# Patient Record
Sex: MALE | Race: WHITE | NOT HISPANIC OR LATINO | Employment: FULL TIME | URBAN - METROPOLITAN AREA
[De-identification: names, ages, dates, MRNs, and addresses within clinical notes are randomized per-mention and may not be internally consistent; named-entity substitution may affect disease eponyms.]

---

## 2017-04-07 DIAGNOSIS — F90.2 ADHD (ATTENTION DEFICIT HYPERACTIVITY DISORDER), COMBINED TYPE: ICD-10-CM

## 2017-04-07 RX ORDER — DEXTROAMPHETAMINE SACCHARATE, AMPHETAMINE ASPARTATE, DEXTROAMPHETAMINE SULFATE AND AMPHETAMINE SULFATE 5; 5; 5; 5 MG/1; MG/1; MG/1; MG/1
20 TABLET ORAL 2 TIMES DAILY
Qty: 60 TABLET | Refills: 0 | Status: SHIPPED | OUTPATIENT
Start: 2017-04-07 | End: 2017-08-21

## 2017-04-07 RX ORDER — DEXTROAMPHETAMINE SACCHARATE, AMPHETAMINE ASPARTATE, DEXTROAMPHETAMINE SULFATE AND AMPHETAMINE SULFATE 5; 5; 5; 5 MG/1; MG/1; MG/1; MG/1
20 TABLET ORAL 2 TIMES DAILY
Qty: 60 TABLET | Refills: 0 | Status: SHIPPED | OUTPATIENT
Start: 2017-05-06 | End: 2017-08-21

## 2017-04-07 RX ORDER — DEXTROAMPHETAMINE SACCHARATE, AMPHETAMINE ASPARTATE, DEXTROAMPHETAMINE SULFATE AND AMPHETAMINE SULFATE 5; 5; 5; 5 MG/1; MG/1; MG/1; MG/1
20 TABLET ORAL 2 TIMES DAILY
Qty: 60 TABLET | Refills: 0 | Status: SHIPPED | OUTPATIENT
Start: 2017-06-06 | End: 2017-07-03

## 2017-04-07 NOTE — TELEPHONE ENCOUNTER
I called patient and informed him that his prescriptions are ready to be picked up.    rx in book.    Laura Alcantar, CMA

## 2017-04-07 NOTE — TELEPHONE ENCOUNTER
Luis Alberto Marcuses Quanton called and left a message requesting a refill of their Pending Prescriptions:                       Disp   Refills    amphetamine-dextroamphetamine (ADDERALL) *60 tab*0            Sig: Take 1 tablet (20 mg) by mouth 2 times daily    amphetamine-dextroamphetamine (ADDERALL) *60 tab*0            Sig: Take 1 tablet (20 mg) by mouth 2 times daily    amphetamine-dextroamphetamine (ADDERALL) *60 tab*0            Sig: Take 1 tablet (20 mg) by mouth 2 times daily   If OK please print out and give to clinical support. We will than call the patient at [phone] to let them know that it is ready to be  at the [facility]. Thank-You,  Cara TOMLINSON WILL BE DUE FOR HIS 6 MO RECHECK AFTER THIS REFILL

## 2017-07-03 ENCOUNTER — TELEPHONE (OUTPATIENT)
Dept: FAMILY MEDICINE | Facility: CLINIC | Age: 32
End: 2017-07-03

## 2017-07-03 DIAGNOSIS — F90.2 ADHD (ATTENTION DEFICIT HYPERACTIVITY DISORDER), COMBINED TYPE: ICD-10-CM

## 2017-07-03 RX ORDER — DEXTROAMPHETAMINE SACCHARATE, AMPHETAMINE ASPARTATE, DEXTROAMPHETAMINE SULFATE AND AMPHETAMINE SULFATE 5; 5; 5; 5 MG/1; MG/1; MG/1; MG/1
20 TABLET ORAL 2 TIMES DAILY
Qty: 60 TABLET | Refills: 0 | Status: SHIPPED | OUTPATIENT
Start: 2017-07-03 | End: 2018-03-07

## 2017-07-03 NOTE — TELEPHONE ENCOUNTER
[facility] can you fill for JC - Luis Alberto is out of his meds and needs to be seen, however, he will be out of town so he is wondering if he can get one month and then will see [facility].  He would like to pick this up Wednesday    Patient's phone #[phone]    Pending Prescriptions:                       Disp   Refills    amphetamine-dextroamphetamine (ADDERALL) *60 tab*0            Sig: Take 1 tablet (20 mg) by mouth 2 times daily    USUALLY GETS 3 MONTHS AT A TIME, HOWEVER OVERDUE FOR OV SO JUST REFILLED #30

## 2017-07-03 NOTE — TELEPHONE ENCOUNTER
Called Pt to let him know that his Prescription is here and ready to .   Also that he needed an O.V.     Kamilla.TERA Tran ([facility])

## 2017-08-21 ENCOUNTER — OFFICE VISIT (OUTPATIENT)
Dept: FAMILY MEDICINE | Facility: CLINIC | Age: 32
End: 2017-08-21

## 2017-08-21 VITALS
TEMPERATURE: 98 F | OXYGEN SATURATION: 98 % | WEIGHT: 236.2 LBS | HEIGHT: 73 IN | HEART RATE: 75 BPM | BODY MASS INDEX: 31.3 KG/M2 | DIASTOLIC BLOOD PRESSURE: 90 MMHG | SYSTOLIC BLOOD PRESSURE: 120 MMHG

## 2017-08-21 DIAGNOSIS — B35.3 TINEA PEDIS OF LEFT FOOT: ICD-10-CM

## 2017-08-21 DIAGNOSIS — F90.2 ADHD (ATTENTION DEFICIT HYPERACTIVITY DISORDER), COMBINED TYPE: Primary | ICD-10-CM

## 2017-08-21 PROCEDURE — 99213 OFFICE O/P EST LOW 20 MIN: CPT | Performed by: FAMILY MEDICINE

## 2017-08-21 RX ORDER — DEXTROAMPHETAMINE SACCHARATE, AMPHETAMINE ASPARTATE, DEXTROAMPHETAMINE SULFATE AND AMPHETAMINE SULFATE 5; 5; 5; 5 MG/1; MG/1; MG/1; MG/1
20 TABLET ORAL 2 TIMES DAILY
Qty: 60 TABLET | Refills: 0 | Status: SHIPPED | OUTPATIENT
Start: 2017-10-20 | End: 2017-11-29

## 2017-08-21 RX ORDER — DEXTROAMPHETAMINE SACCHARATE, AMPHETAMINE ASPARTATE, DEXTROAMPHETAMINE SULFATE AND AMPHETAMINE SULFATE 5; 5; 5; 5 MG/1; MG/1; MG/1; MG/1
20 TABLET ORAL 2 TIMES DAILY
Qty: 60 TABLET | Refills: 0 | Status: SHIPPED | OUTPATIENT
Start: 2017-08-21 | End: 2017-09-20

## 2017-08-21 RX ORDER — DEXTROAMPHETAMINE SACCHARATE, AMPHETAMINE ASPARTATE, DEXTROAMPHETAMINE SULFATE AND AMPHETAMINE SULFATE 5; 5; 5; 5 MG/1; MG/1; MG/1; MG/1
20 TABLET ORAL 2 TIMES DAILY
Qty: 60 TABLET | Refills: 0 | Status: SHIPPED | OUTPATIENT
Start: 2017-09-20 | End: 2017-11-29

## 2017-08-21 NOTE — PROGRESS NOTES
"ADHD Follow Up   SUBJECTIVE:     Are your symptoms controlled? yes  Are you satisfied with your current medication dosage? yes  Are you taking your medication regularly? yes  Are you experiencing any insomnia? no  Are you experiencing any jitteriness? no  Are you experiencing any loss of appetite or weight loss? no  Are you experiencing any other side effects? no  ROS:   CONSTITUTIONAL:NEGATIVE   RESP: NEGATIVE   CV: NEGATIVE   NEURO: NEGATIVE    Left little toe space with rad rash, no itch, slight burn  OBJECTIVE:  /90 (BP Location: Left arm, Patient Position: Chair, Cuff Size: Adult Large)  Pulse 75  Temp 98  F (36.7  C) (Oral)  Ht 1.854 m (6' 1\")  Wt 107.1 kg (236 lb 3.2 oz)  SpO2 98%  BMI 31.16 kg/m2  S1 and S2 normal, no murmurs, clicks, gallops or rubs. Regular rate and rhythm. Chest is clear; no wheezes or rales. No edema or JVD.  Skin: erythematous mild maceration left smal, toe web space     ASSESSMENT:    (F90.2) ADHD (attention deficit hyperactivity disorder), combined type  (primary encounter diagnosis)  Comment: well controlled  Plan: amphetamine-dextroamphetamine (ADDERALL) 20 MG         per tablet, amphetamine-dextroamphetamine         (ADDERALL) 20 MG per tablet,         amphetamine-dextroamphetamine (ADDERALL) 20 MG         per tablet        continue current medications at current doses     (B35.3) Tinea pedis of left foot  Comment: discussed diagnosis  Plan:  recommend antifungal cream, spray or powder OTC, use powder  to wick away moisture, avoid prolonged moisture exposure        "

## 2017-08-21 NOTE — MR AVS SNAPSHOT
"              After Visit Summary   8/21/2017    Luis Alberto Claudio    MRN: [id number]           Patient Information     Date Of Birth          1985        Visit Information        Provider Department      8/21/2017 10:15 AM Dat Gamino MD [facility]        Today's Diagnoses     ADHD (attention deficit hyperactivity disorder), combined type    -  1    Tinea pedis of left foot           Follow-ups after your visit        Who to contact     If you have questions or need follow up information about today's clinic visit or your schedule please contact [facility] directly at [phone].  Normal or non-critical lab and imaging results will be communicated to you by MyChart, letter or phone within 4 business days after the clinic has received the results. If you do not hear from us within 7 days, please contact the clinic through MyChart or phone. If you have a critical or abnormal lab result, we will notify you by phone as soon as possible.  Submit refill requests through [employer] or call your pharmacy and they will forward the refill request to us. Please allow 3 business days for your refill to be completed.          Additional Information About Your Visit        Care EveryWhere ID     This is your Care EveryWhere ID. This could be used by other organizations to access your [place] medical records  [id number]        Your Vitals Were     Pulse Temperature Height Pulse Oximetry BMI (Body Mass Index)       75 98  F (36.7  C) (Oral) 1.854 m (6' 1\") 98% 31.16 kg/m2        Blood Pressure from Last 3 Encounters:   08/21/17 120/90   12/19/16 128/84   06/01/16 112/80    Weight from Last 3 Encounters:   08/21/17 107.1 kg (236 lb 3.2 oz)   12/19/16 104.2 kg (229 lb 12.8 oz)   06/01/16 97.3 kg (214 lb 6.4 oz)              Today, you had the following     No orders found for display         Today's Medication Changes          These changes are accurate as of: " 8/21/17 10:31 AM.  If you have any questions, ask your nurse or doctor.               These medicines have changed or have updated prescriptions.        Dose/Directions    * amphetamine-dextroamphetamine 20 MG per tablet   Commonly known as:  ADDERALL   This may have changed:  Another medication with the same name was added. Make sure you understand how and when to take each.   Used for:  ADHD (attention deficit hyperactivity disorder), combined type   Changed by:  Dat Gamino MD        Dose:  20 mg   Take 1 tablet (20 mg) by mouth 2 times daily   Quantity:  60 tablet   Refills:  0       * amphetamine-dextroamphetamine 20 MG per tablet   Commonly known as:  ADDERALL   This may have changed:  You were already taking a medication with the same name, and this prescription was added. Make sure you understand how and when to take each.   Used for:  ADHD (attention deficit hyperactivity disorder), combined type   Changed by:  Dat Gamino MD        Dose:  20 mg   Take 1 tablet (20 mg) by mouth 2 times daily   Quantity:  60 tablet   Refills:  0       * amphetamine-dextroamphetamine 20 MG per tablet   Commonly known as:  ADDERALL   This may have changed:  You were already taking a medication with the same name, and this prescription was added. Make sure you understand how and when to take each.   Used for:  ADHD (attention deficit hyperactivity disorder), combined type   Changed by:  Dat Gamino MD        Dose:  20 mg   Start taking on:  9/20/2017   Take 1 tablet (20 mg) by mouth 2 times daily   Quantity:  60 tablet   Refills:  0       * amphetamine-dextroamphetamine 20 MG per tablet   Commonly known as:  ADDERALL   This may have changed:  You were already taking a medication with the same name, and this prescription was added. Make sure you understand how and when to take each.   Used for:  ADHD (attention deficit hyperactivity disorder), combined type   Changed by:  Dat Gamino  MD Salazar        Dose:  20 mg   Start taking on:  10/20/2017   Take 1 tablet (20 mg) by mouth 2 times daily   Quantity:  60 tablet   Refills:  0       * Notice:  This list has 4 medication(s) that are the same as other medications prescribed for you. Read the directions carefully, and ask your doctor or other care provider to review them with you.         Where to get your medicines      Some of these will need a paper prescription and others can be bought over the counter.  Ask your nurse if you have questions.     Bring a paper prescription for each of these medications     amphetamine-dextroamphetamine 20 MG per tablet    amphetamine-dextroamphetamine 20 MG per tablet    amphetamine-dextroamphetamine 20 MG per tablet                Primary Care Provider Office Phone # Fax #    Dat Gamino [phone] [address] [address]        Equal Access to Services     [facility]: Hadii aad ku hadasho Soomaali, waaxda luqadaha, qaybta kaalmada adeegyada, mary beth torres hayamina garcia . So [facility] [phone].    ATENCIÓN: Si habla español, tiene a boyd disposición servicios gratuitos de asistencia lingüística. [facility] [phone].    We comply with applicable federal civil rights laws and [place] laws. We do not discriminate on the basis of race, color, national origin, age, disability sex, sexual orientation or gender identity.            Thank you!     Thank you for choosing [facility]  for your care. Our goal is always to provide you with excellent care. Hearing back from our patients is one way we can continue to improve our services. Please take a few minutes to complete the written survey that you may receive in the mail after your visit with us. Thank you!             Your Updated Medication List - Protect others around you: Learn how to safely use, store and throw away your medicines at www.disposemymeds.org.           This list is accurate as of: 8/21/17 10:31 AM.  Always use your most recent med list.                   Brand Name Dispense Instructions for use Diagnosis    * amphetamine-dextroamphetamine 20 MG per tablet    ADDERALL    60 tablet    Take 1 tablet (20 mg) by mouth 2 times daily    ADHD (attention deficit hyperactivity disorder), combined type       * amphetamine-dextroamphetamine 20 MG per tablet    ADDERALL    60 tablet    Take 1 tablet (20 mg) by mouth 2 times daily    ADHD (attention deficit hyperactivity disorder), combined type       * amphetamine-dextroamphetamine 20 MG per tablet   Start taking on:  9/20/2017    ADDERALL    60 tablet    Take 1 tablet (20 mg) by mouth 2 times daily    ADHD (attention deficit hyperactivity disorder), combined type       * amphetamine-dextroamphetamine 20 MG per tablet   Start taking on:  10/20/2017    ADDERALL    60 tablet    Take 1 tablet (20 mg) by mouth 2 times daily    ADHD (attention deficit hyperactivity disorder), combined type       * Notice:  This list has 4 medication(s) that are the same as other medications prescribed for you. Read the directions carefully, and ask your doctor or other care provider to review them with you.

## 2017-08-21 NOTE — NURSING NOTE
Luis Alberto Claudio is here today for a non fasting recheck of his ADHD medication.    Pre-Visit Screening :  Immunizations : up to date  Colon Screening : NA  Asthma Action Test/Plan : NA  PHQ9/GAD7 :  NA    Pulse - regular  My Chart - declines    CLASSIFICATION OF OVERWEIGHT AND OBESITY BY BMI                         Obesity Class           BMI(kg/m2)  Underweight                                    < 18.5  Normal                                         18.5-24.9  Overweight                                     25.0-29.9  OBESITY                     I                  30.0-34.9                              II                 35.0-39.9  EXTREME OBESITY             III                >40                             Patient's  BMI Body mass index is 31.16 kg/(m^2).  http://hin.nhlbi.nih.gov/menuplanner/menu.cgi  Questioned patient about current smoking habits.  Pt. has never smoked.    Laura Alcantar, CMA

## 2017-11-29 DIAGNOSIS — F90.2 ADHD (ATTENTION DEFICIT HYPERACTIVITY DISORDER), COMBINED TYPE: ICD-10-CM

## 2017-11-29 RX ORDER — DEXTROAMPHETAMINE SACCHARATE, AMPHETAMINE ASPARTATE, DEXTROAMPHETAMINE SULFATE AND AMPHETAMINE SULFATE 5; 5; 5; 5 MG/1; MG/1; MG/1; MG/1
20 TABLET ORAL 2 TIMES DAILY
Qty: 60 TABLET | Refills: 0 | Status: SHIPPED | OUTPATIENT
Start: 2018-02-28 | End: 2018-03-07

## 2017-11-29 RX ORDER — DEXTROAMPHETAMINE SACCHARATE, AMPHETAMINE ASPARTATE, DEXTROAMPHETAMINE SULFATE AND AMPHETAMINE SULFATE 5; 5; 5; 5 MG/1; MG/1; MG/1; MG/1
20 TABLET ORAL 2 TIMES DAILY
Qty: 60 TABLET | Refills: 0 | Status: SHIPPED | OUTPATIENT
Start: 2018-01-28 | End: 2017-11-30

## 2017-11-29 RX ORDER — DEXTROAMPHETAMINE SACCHARATE, AMPHETAMINE ASPARTATE, DEXTROAMPHETAMINE SULFATE AND AMPHETAMINE SULFATE 5; 5; 5; 5 MG/1; MG/1; MG/1; MG/1
20 TABLET ORAL 2 TIMES DAILY
Qty: 60 TABLET | Refills: 0 | Status: SHIPPED | OUTPATIENT
Start: 2017-11-29 | End: 2017-12-29

## 2017-11-29 NOTE — TELEPHONE ENCOUNTER
Luis Alberto Claudio is requesting a refill of:    Pending Prescriptions:                       Disp   Refills    amphetamine-dextroamphetamine (ADDERALL) *60 tab*0            Sig: Take 1 tablet (20 mg) by mouth 2 times daily    amphetamine-dextroamphetamine (ADDERALL) *60 tab*0            Sig: Take 1 tablet (20 mg) by mouth 2 times daily    amphetamine-dextroamphetamine (ADDERALL) *60 tab*0            Sig: Take 1 tablet (20 mg) by mouth 2 times daily    Can call - [phone]

## 2017-11-30 DIAGNOSIS — F90.2 ADHD (ATTENTION DEFICIT HYPERACTIVITY DISORDER), COMBINED TYPE: ICD-10-CM

## 2017-11-30 RX ORDER — DEXTROAMPHETAMINE SACCHARATE, AMPHETAMINE ASPARTATE, DEXTROAMPHETAMINE SULFATE AND AMPHETAMINE SULFATE 5; 5; 5; 5 MG/1; MG/1; MG/1; MG/1
20 TABLET ORAL 2 TIMES DAILY
Qty: 60 TABLET | Refills: 0 | Status: SHIPPED | OUTPATIENT
Start: 2017-12-28 | End: 2018-03-07

## 2018-03-07 DIAGNOSIS — F90.2 ADHD (ATTENTION DEFICIT HYPERACTIVITY DISORDER), COMBINED TYPE: ICD-10-CM

## 2018-03-07 RX ORDER — DEXTROAMPHETAMINE SACCHARATE, AMPHETAMINE ASPARTATE, DEXTROAMPHETAMINE SULFATE AND AMPHETAMINE SULFATE 5; 5; 5; 5 MG/1; MG/1; MG/1; MG/1
20 TABLET ORAL 2 TIMES DAILY
Qty: 60 TABLET | Refills: 0 | Status: SHIPPED | OUTPATIENT
Start: 2018-03-07 | End: 2018-10-22

## 2018-03-07 RX ORDER — DEXTROAMPHETAMINE SACCHARATE, AMPHETAMINE ASPARTATE, DEXTROAMPHETAMINE SULFATE AND AMPHETAMINE SULFATE 5; 5; 5; 5 MG/1; MG/1; MG/1; MG/1
20 TABLET ORAL 2 TIMES DAILY
Qty: 60 TABLET | Refills: 0 | Status: SHIPPED | OUTPATIENT
Start: 2018-04-07 | End: 2018-10-22

## 2018-03-07 RX ORDER — DEXTROAMPHETAMINE SACCHARATE, AMPHETAMINE ASPARTATE, DEXTROAMPHETAMINE SULFATE AND AMPHETAMINE SULFATE 5; 5; 5; 5 MG/1; MG/1; MG/1; MG/1
20 TABLET ORAL 2 TIMES DAILY
Qty: 60 TABLET | Refills: 0 | Status: SHIPPED | OUTPATIENT
Start: 2018-05-07 | End: 2018-06-19

## 2018-03-07 NOTE — TELEPHONE ENCOUNTER
Luis Alberto is calling for his refills    Pending Prescriptions:                       Disp   Refills    amphetamine-dextroamphetamine (ADDERALL) *60 tab*0            Sig: Take 1 tablet (20 mg) by mouth 2 times daily    amphetamine-dextroamphetamine (ADDERALL) *60 tab*0            Sig: Take 1 tablet (20 mg) by mouth 2 times daily    amphetamine-dextroamphetamine (ADDERALL) *60 tab*0            Sig: Take 1 tablet (20 mg) by mouth 2 times daily

## 2018-03-08 NOTE — TELEPHONE ENCOUNTER
Called patient and LM informing him that his prescriptions are ready to be picked up.  Rx in book.    Laura Alcantar CMA

## 2018-04-20 ENCOUNTER — TRANSFERRED RECORDS (OUTPATIENT)
Dept: FAMILY MEDICINE | Facility: CLINIC | Age: 33
End: 2018-04-20

## 2018-06-19 ENCOUNTER — TELEPHONE (OUTPATIENT)
Dept: FAMILY MEDICINE | Facility: CLINIC | Age: 33
End: 2018-06-19

## 2018-06-19 DIAGNOSIS — F90.2 ADHD (ATTENTION DEFICIT HYPERACTIVITY DISORDER), COMBINED TYPE: ICD-10-CM

## 2018-06-19 RX ORDER — DEXTROAMPHETAMINE SACCHARATE, AMPHETAMINE ASPARTATE, DEXTROAMPHETAMINE SULFATE AND AMPHETAMINE SULFATE 5; 5; 5; 5 MG/1; MG/1; MG/1; MG/1
20 TABLET ORAL 2 TIMES DAILY
Qty: 60 TABLET | Refills: 0 | Status: SHIPPED | OUTPATIENT
Start: 2018-06-19 | End: 2018-10-22

## 2018-06-19 NOTE — TELEPHONE ENCOUNTER
Please let the patient know that a 30 day refill has been sent for their prescription.  They are due for a return non-fasting office visit within 30 days.  Failure to schedule an appointment WILL result in no further refills of his/her medication.

## 2018-06-19 NOTE — TELEPHONE ENCOUNTER
Luis Alberto called clinic support today requesting a refill of the following     Pending Prescriptions:                       Disp   Refills    amphetamine-dextroamphetamine (ADDERALL) *60 tab*0            Sig: Take 1 tablet (20 mg) by mouth 2 times daily    Pts last office visit was 8-21-17    Dr bruno when do you want him back? Is he ok for another 3 months or does he need to be seen before next refill?    Please advise

## 2018-07-16 ENCOUNTER — OFFICE VISIT (OUTPATIENT)
Dept: FAMILY MEDICINE | Facility: CLINIC | Age: 33
End: 2018-07-16

## 2018-07-16 VITALS
WEIGHT: 234.2 LBS | DIASTOLIC BLOOD PRESSURE: 76 MMHG | SYSTOLIC BLOOD PRESSURE: 128 MMHG | TEMPERATURE: 98.3 F | BODY MASS INDEX: 30.9 KG/M2 | HEART RATE: 90 BPM | OXYGEN SATURATION: 98 %

## 2018-07-16 DIAGNOSIS — M25.571 PAIN IN JOINT, ANKLE AND FOOT, RIGHT: ICD-10-CM

## 2018-07-16 DIAGNOSIS — F90.2 ADHD (ATTENTION DEFICIT HYPERACTIVITY DISORDER), COMBINED TYPE: Primary | ICD-10-CM

## 2018-07-16 PROCEDURE — 99213 OFFICE O/P EST LOW 20 MIN: CPT | Performed by: FAMILY MEDICINE

## 2018-07-16 RX ORDER — DEXTROAMPHETAMINE SACCHARATE, AMPHETAMINE ASPARTATE, DEXTROAMPHETAMINE SULFATE AND AMPHETAMINE SULFATE 7.5; 7.5; 7.5; 7.5 MG/1; MG/1; MG/1; MG/1
30 TABLET ORAL 2 TIMES DAILY
Qty: 60 TABLET | Refills: 0 | Status: SHIPPED | OUTPATIENT
Start: 2018-07-16 | End: 2018-08-31

## 2018-07-16 NOTE — NURSING NOTE
Luis Alberto is here for a med check    Pre-Visit Screening :  Immunizations : up to date  Colon Screening : na  Asthma Action Test/Plan : na  PHQ9/GAD7 :  Na    Pulse - regular  My Chart - declines    CLASSIFICATION OF OVERWEIGHT AND OBESITY BY BMI                         Obesity Class           BMI(kg/m2)  Underweight                                    < 18.5  Normal                                         18.5-24.9  Overweight                                     25.0-29.9  OBESITY                     I                  30.0-34.9                              II                 35.0-39.9  EXTREME OBESITY             III                >40                             Patient's  BMI Body mass index is 22.15 kg/(m^2).  http://hin.nhlbi.nih.gov/menuplanner/menu.cgi  Questioned patient about current smoking habits.  Pt. has never smoked.  The patient has verbalized that it is ok to leave a detailed voice message on the patient's cell phone with results/recommendations from this visit.       Verified [phone]  phone number:

## 2018-07-16 NOTE — MR AVS SNAPSHOT
After Visit Summary   7/16/2018    Luis Alberto Claudio    MRN: [id number]           Patient Information     Date Of Birth          1985        Visit Information        Provider Department      7/16/2018 3:30 PM Dat Gamino MD [facility]        Today's Diagnoses     ADHD (attention deficit hyperactivity disorder), combined type    -  1    Pain in joint, ankle and foot, right           Follow-ups after your visit        Additional Services     ORTHOPEDICS ADULT REFERRAL       Your provider has referred you to: [facility] [phone]   [email or web address]    Please be aware that coverage of these services is subject to the terms and limitations of your health insurance plan.  Call member services at your health plan with any benefit or coverage questions.      Please bring the following to your appointment:    >>   Any x-rays, CTs or MRIs which have been performed.  Contact the facility where they were done to arrange for  prior to your scheduled appointment.    >>   List of current medications   >>   This referral request   >>   Any documents/labs given to you for this referral                  Who to contact     If you have questions or need follow up information about today's clinic visit or your schedule please contact [facility] directly at [phone].  Normal or non-critical lab and imaging results will be communicated to you by MyChart, letter or phone within 4 business days after the clinic has received the results. If you do not hear from us within 7 days, please contact the clinic through MyChart or phone. If you have a critical or abnormal lab result, we will notify you by phone as soon as possible.  Submit refill requests through [employer] or call your pharmacy and they will forward the refill request to us. Please allow 3 business days for your refill to be completed.           Additional Information About Your Visit        Care EveryWhere ID     This is your Care EveryWhere ID. This could be used by other organizations to access your [place] medical records  [id number]        Your Vitals Were     Pulse Temperature Pulse Oximetry BMI (Body Mass Index)          90 98.3  F (36.8  C) (Oral) 98% 30.9 kg/m2         Blood Pressure from Last 3 Encounters:   07/16/18 128/76   08/21/17 120/90   12/19/16 128/84    Weight from Last 3 Encounters:   07/16/18 106.2 kg (234 lb 3.2 oz)   08/21/17 107.1 kg (236 lb 3.2 oz)   12/19/16 104.2 kg (229 lb 12.8 oz)              We Performed the Following     ORTHOPEDICS ADULT REFERRAL          Today's Medication Changes          These changes are accurate as of 7/16/18  3:54 PM.  If you have any questions, ask your nurse or doctor.               These medicines have changed or have updated prescriptions.        Dose/Directions    * amphetamine-dextroamphetamine 20 MG per tablet   Commonly known as:  ADDERALL   This may have changed:  Another medication with the same name was added. Make sure you understand how and when to take each.   Used for:  ADHD (attention deficit hyperactivity disorder), combined type   Changed by:  Dat Gamino MD        Dose:  20 mg   Take 1 tablet (20 mg) by mouth 2 times daily   Quantity:  60 tablet   Refills:  0       * amphetamine-dextroamphetamine 20 MG per tablet   Commonly known as:  ADDERALL   This may have changed:  Another medication with the same name was added. Make sure you understand how and when to take each.   Used for:  ADHD (attention deficit hyperactivity disorder), combined type   Changed by:  Dat Gamino MD        Dose:  20 mg   Take 1 tablet (20 mg) by mouth 2 times daily   Quantity:  60 tablet   Refills:  0       * amphetamine-dextroamphetamine 20 MG per tablet   Commonly known as:  ADDERALL   This may have changed:  Another medication with the same name was added. Make sure  you understand how and when to take each.   Used for:  ADHD (attention deficit hyperactivity disorder), combined type   Changed by:  Dat Gamino MD        Dose:  20 mg   Take 1 tablet (20 mg) by mouth 2 times daily   Quantity:  60 tablet   Refills:  0       * amphetamine-dextroamphetamine 30 MG per tablet   Commonly known as:  ADDERALL   This may have changed:  You were already taking a medication with the same name, and this prescription was added. Make sure you understand how and when to take each.   Used for:  ADHD (attention deficit hyperactivity disorder), combined type   Changed by:  Dat Gamino MD        Dose:  30 mg   Take 1 tablet (30 mg) by mouth 2 times daily   Quantity:  60 tablet   Refills:  0       * Notice:  This list has 4 medication(s) that are the same as other medications prescribed for you. Read the directions carefully, and ask your doctor or other care provider to review them with you.         Where to get your medicines      Some of these will need a paper prescription and others can be bought over the counter.  Ask your nurse if you have questions.     Bring a paper prescription for each of these medications     amphetamine-dextroamphetamine 30 MG per tablet                Primary Care Provider Office Phone # Fax #    Dat Gamino [phone]       [address] [place] [address]        Equal Access to Services     JOSY SEGUNDO AH: Hadii robert boyero Sochristainaali, waaxda luqadaha, qaybta kaalmada adeegyada, waxay brian colon. So [facility] [phone].    ATENCIÓN: Si habla español, tiene a boyd disposición servicios gratuitos de asistencia lingüística. Llame al [phone].    We comply with applicable federal civil rights laws and [place] laws. We do not discriminate on the basis of race, color, national origin, age, disability, sex, sexual orientation, or gender identity.            Thank you!      Thank you for choosing [facility]  for your care. Our goal is always to provide you with excellent care. Hearing back from our patients is one way we can continue to improve our services. Please take a few minutes to complete the written survey that you may receive in the mail after your visit with us. Thank you!             Your Updated Medication List - Protect others around you: Learn how to safely use, store and throw away your medicines at www.disposemymeds.org.          This list is accurate as of 7/16/18  3:54 PM.  Always use your most recent med list.                   Brand Name Dispense Instructions for use Diagnosis    * amphetamine-dextroamphetamine 20 MG per tablet    ADDERALL    60 tablet    Take 1 tablet (20 mg) by mouth 2 times daily    ADHD (attention deficit hyperactivity disorder), combined type       * amphetamine-dextroamphetamine 20 MG per tablet    ADDERALL    60 tablet    Take 1 tablet (20 mg) by mouth 2 times daily    ADHD (attention deficit hyperactivity disorder), combined type       * amphetamine-dextroamphetamine 20 MG per tablet    ADDERALL    60 tablet    Take 1 tablet (20 mg) by mouth 2 times daily    ADHD (attention deficit hyperactivity disorder), combined type       * amphetamine-dextroamphetamine 30 MG per tablet    ADDERALL    60 tablet    Take 1 tablet (30 mg) by mouth 2 times daily    ADHD (attention deficit hyperactivity disorder), combined type       * Notice:  This list has 4 medication(s) that are the same as other medications prescribed for you. Read the directions carefully, and ask your doctor or other care provider to review them with you.

## 2018-07-16 NOTE — PROGRESS NOTES
ADHD Follow Up   SUBJECTIVE:     Are your symptoms controlled? Not as well as previously -worse last 2 months-feels less focussed  Are you satisfied with your current medication dosage? Not sure  Are you taking your medication regularly? yes  Are you experiencing any insomnia? no  Are you experiencing any jitteriness? no  Are you experiencing any loss of appetite or weight loss? no  Are you experiencing any other side effects? No    Right ankle locking at times when [id number] had surgery for fx in that ankle  ROS:   CONSTITUTIONAL:NEGATIVE   RESP: NEGATIVE   CV: NEGATIVE   NEURO: NEGATIVE  OBJECTIVE:  /76 (BP Location: Right arm, Patient Position: Chair, Cuff Size: Adult Large)  Pulse 90  Temp 98.3  F (36.8  C) (Oral)  Wt 106.2 kg (234 lb 3.2 oz)  SpO2 98%  BMI 30.9 kg/m2  S1 and S2 normal, no murmurs, clicks, gallops or rubs. Regular rate and rhythm. Chest is clear; no wheezes or rales. No edema or JVD.     ASSESSMENT: /PLAN:   (F90.2) ADHD (attention deficit hyperactivity disorder), combined type  (primary encounter diagnosis)  Comment: suboptimal control-has not changed dose in years  Plan: trial 30 mg, I reviewed the risks, benefits, and possible side effects of the medication.  The patient had an opportunity to ask any questions regarding the treatment plan. The patient was encouraged to call my office if any problems.        (I81.838) Pain in joint, ankle and foot, right  Comment: catching and locking post fx and surgery  Plan: ORTHOPEDICS ADULT REFERRAL        Refer back to ortho

## 2018-08-23 ENCOUNTER — TRANSFERRED RECORDS (OUTPATIENT)
Dept: FAMILY MEDICINE | Facility: CLINIC | Age: 33
End: 2018-08-23

## 2018-08-31 DIAGNOSIS — F90.2 ADHD (ATTENTION DEFICIT HYPERACTIVITY DISORDER), COMBINED TYPE: ICD-10-CM

## 2018-08-31 NOTE — TELEPHONE ENCOUNTER
Patient last seen 7/16/18 is please with the higher dose so would like to continue on the 30 mg    Pending Prescriptions:                       Disp   Refills    amphetamine-dextroamphetamine (ADDERALL) *60 tab*0            Sig: Take 1 tablet (30 mg) by mouth 2 times daily    Patient's phone #[phone]

## 2018-09-04 RX ORDER — DEXTROAMPHETAMINE SACCHARATE, AMPHETAMINE ASPARTATE, DEXTROAMPHETAMINE SULFATE AND AMPHETAMINE SULFATE 7.5; 7.5; 7.5; 7.5 MG/1; MG/1; MG/1; MG/1
30 TABLET ORAL 2 TIMES DAILY
Qty: 60 TABLET | Refills: 0 | Status: SHIPPED | OUTPATIENT
Start: 2018-09-04 | End: 2018-10-22

## 2018-09-10 ENCOUNTER — OFFICE VISIT (OUTPATIENT)
Dept: FAMILY MEDICINE | Facility: CLINIC | Age: 33
End: 2018-09-10

## 2018-09-10 VITALS
BODY MASS INDEX: 29.77 KG/M2 | HEIGHT: 74 IN | OXYGEN SATURATION: 96 % | SYSTOLIC BLOOD PRESSURE: 136 MMHG | TEMPERATURE: 98.1 F | HEART RATE: 85 BPM | WEIGHT: 232 LBS | DIASTOLIC BLOOD PRESSURE: 90 MMHG

## 2018-09-10 DIAGNOSIS — Z01.818 PRE-OP EXAM: Primary | ICD-10-CM

## 2018-09-10 DIAGNOSIS — F90.2 ADHD (ATTENTION DEFICIT HYPERACTIVITY DISORDER), COMBINED TYPE: ICD-10-CM

## 2018-09-10 DIAGNOSIS — M25.571 PAIN IN JOINT, ANKLE AND FOOT, RIGHT: ICD-10-CM

## 2018-09-10 DIAGNOSIS — R03.0 ELEVATED BLOOD PRESSURE READING WITHOUT DIAGNOSIS OF HYPERTENSION: ICD-10-CM

## 2018-09-10 LAB — HEMOGLOBIN: 16.5 G/DL (ref 13.3–17.7)

## 2018-09-10 PROCEDURE — 85018 HEMOGLOBIN: CPT | Performed by: PHYSICIAN ASSISTANT

## 2018-09-10 PROCEDURE — 99214 OFFICE O/P EST MOD 30 MIN: CPT | Performed by: PHYSICIAN ASSISTANT

## 2018-09-10 PROCEDURE — 36415 COLL VENOUS BLD VENIPUNCTURE: CPT | Performed by: PHYSICIAN ASSISTANT

## 2018-09-10 NOTE — MR AVS SNAPSHOT
"              After Visit Summary   9/10/2018    Luis Alberto Claudio    MRN: [id number]           Patient Information     Date Of Birth          1985        Visit Information        Provider Department      9/10/2018 1:00 PM Erica Barrios PA [facility]        Today's Diagnoses     Pre-op exam    -  1    Pain in joint, ankle and foot, right        ADHD (attention deficit hyperactivity disorder), combined type        Elevated blood pressure reading without diagnosis of hypertension           Follow-ups after your visit        Who to contact     If you have questions or need follow up information about today's clinic visit or your schedule please contact [facility] directly at [phone].  Normal or non-critical lab and imaging results will be communicated to you by MyChart, letter or phone within 4 business days after the clinic has received the results. If you do not hear from us within 7 days, please contact the clinic through MyChart or phone. If you have a critical or abnormal lab result, we will notify you by phone as soon as possible.  Submit refill requests through [employer] or call your pharmacy and they will forward the refill request to us. Please allow 3 business days for your refill to be completed.          Additional Information About Your Visit        Care EveryWhere ID     This is your Care EveryWhere ID. This could be used by other organizations to access your [facility] medical records  [id number]        Your Vitals Were     Pulse Temperature Height Pulse Oximetry BMI (Body Mass Index)       85 98.1  F (36.7  C) (Oral) 1.867 m (6' 1.5\") 96% 30.19 kg/m2        Blood Pressure from Last 3 Encounters:   09/10/18 136/90   07/16/18 128/76   08/21/17 120/90    Weight from Last 3 Encounters:   09/10/18 105.2 kg (232 lb)   07/16/18 106.2 kg (234 lb 3.2 oz)   08/21/17 107.1 kg (236 lb 3.2 oz)              We Performed the Following     CL AFF " HEMOGLOBIN (BFP)     VENOUS COLLECTION        Primary Care Provider Office Phone # Fax #    Dat Gamino [phone] [address] [facility] [place] [address]  [facility] [address]        Equal Access to Services     JOSY SEGUNDO : Hadii aad ku hadlurdeso Soomaali, waaxda luqadaha, qaybta kaalmada adeegyada, waxjoe zaragozan adeshubham sprague laAlfonsoamina colon. So [facility] [phone].    ATENCIÓN: Si habla español, tiene a boyd disposición servicios gratuitos de asistencia lingüística. Llame al [phone].    We comply with applicable federal civil rights laws and [place] laws. We do not discriminate on the basis of race, color, national origin, age, disability, sex, sexual orientation, or gender identity.            Thank you!     Thank you for choosing [facility]  for your care. Our goal is always to provide you with excellent care. Hearing back from our patients is one way we can continue to improve our services. Please take a few minutes to complete the written survey that you may receive in the mail after your visit with us. Thank you!             Your Updated Medication List - Protect others around you: Learn how to safely use, store and throw away your medicines at www.disposemymeds.org.          This list is accurate as of 9/10/18  1:41 PM.  Always use your most recent med list.                   Brand Name Dispense Instructions for use Diagnosis    * amphetamine-dextroamphetamine 20 MG per tablet    ADDERALL    60 tablet    Take 1 tablet (20 mg) by mouth 2 times daily    ADHD (attention deficit hyperactivity disorder), combined type       * amphetamine-dextroamphetamine 20 MG per tablet    ADDERALL    60 tablet    Take 1 tablet (20 mg) by mouth 2 times daily    ADHD (attention deficit hyperactivity disorder), combined type       * amphetamine-dextroamphetamine 20 MG per tablet    ADDERALL    60 tablet    Take 1 tablet (20 mg) by mouth 2 times daily    ADHD (attention deficit  hyperactivity disorder), combined type       * amphetamine-dextroamphetamine 30 MG per tablet    ADDERALL    60 tablet    Take 1 tablet (30 mg) by mouth 2 times daily    ADHD (attention deficit hyperactivity disorder), combined type       * Notice:  This list has 4 medication(s) that are the same as other medications prescribed for you. Read the directions carefully, and ask your doctor or other care provider to review them with you.

## 2018-09-10 NOTE — PROGRESS NOTES
[facility]  [address]  [address]  [facility] [address]  [phone]  Dept: [phone]    PRE-OP EVALUATION:  Today's date: 9/10/2018    Luis Alberto Claudio (: 1985) presents for pre-operative evaluation assessment as requested by Dr. Ly.  He requires evaluation and anesthesia risk assessment prior to undergoing surgery/procedure for treatment of Removal of hardware from right ankle .    Proposed Surgery/ Procedure: Removal of old hardware from right ankle  Date of Surgery/ Procedure: 18  Time of Surgery/ Procedure: To be determined-will be sometime in the afternoon  Hospital/Surgical Facility: [facility]  Fax number for surgical facility: [phone]  Primary Physician: Dat Gamino  Type of Anesthesia Anticipated: General    Patient has a Health Care Directive or Living Will:  NO    1. NO - Do you have a history of heart attack, stroke, stent, bypass or surgery on an artery in the head, neck, heart or legs?  2. NO - Do you ever have any pain or discomfort in your chest?  3. NO - Do you have a history of  Heart Failure?  4. NO - Are you troubled by shortness of breath when: walking on the level, up a slight hill or at night?  5. NO - Do you currently have a cold, bronchitis or other respiratory infection?  6. NO - Do you have a cough, shortness of breath or wheezing?  7. YES - Do you sometimes get pains in the calves of your legs when you walk? - right due to reason for surgery  8. NO - Do you or anyone in your family have previous history of blood clots?  9. NO - Do you or does anyone in your family have a serious bleeding problem such as prolonged bleeding following surgeries or cuts?  10. NO - Have you ever had problems with anemia or been told to take iron pills?  11. NO - Have you had any abnormal blood loss such as black, tarry or bloody stools?  12. NO - Have you ever had a blood transfusion?  13. NO - Have you or  any of your relatives ever had problems with anesthesia?  14. NO - Do you have sleep apnea, excessive snoring or daytime drowsiness?  15. NO - Do you have any prosthetic heart valves?  16. NO - Do you have prosthetic joints?        HPI:     HPI related to upcoming procedure: 5 years ago had ankle surgery due to fx, now has broken pin and pain.        See problem list for active medical problems.  Problems all longstanding and stable, except as noted/documented.  See ROS for pertinent symptoms related to these conditions.      BP mildly elevated today  BP Readings from Last 6 Encounters:   09/10/18 136/90   07/16/18 128/76   08/21/17 120/90   12/19/16 128/84   06/01/16 112/80   12/12/15 130/90                                                                                                                                                             .    MEDICAL HISTORY:     Patient Active Problem List    Diagnosis Date Noted     ACP (advance care planning) 06/01/2016     Priority: Medium     Advance Care Planning 6/1/2016: ACP Review of Chart / Resources Provided:  Reviewed chart for advance care plan.  Luis Albertopearl Marcuses Claudio has no plan or code status on file. Discussed available resources and provided with information. Confirmed code status reflects current choices pending further ACP discussions.  Confirmed/documented legally designated decision makers.  Added by Luz Maria Ball             ADHD (attention deficit hyperactivity disorder), combined type 10/07/2015     Priority: Medium     Patient is followed by ALEKSANDRA PRINCE for ongoing prescription of narcotic pain medicine.  Med: adderall.   Maximum use per month:   Expected duration:   Narcotic agreement on file: YES  Clinic visit recommended: Q 6  months        Family history of colon cancer 07/23/2014     Priority: Medium     Health Care Home 09/24/2013     Priority: Medium     State Tier Level:  Tier 0  Status:  n/a  Care Coordinator:  N/A    See Letters  "for [facility] Care Plan            Past Medical History:   Diagnosis Date     ADHD     combined      Urethral stricture 7/20/2009     Past Surgical History:   Procedure Laterality Date     ANKLE SURGERY  2013    fx- [place]     CYSTOSCOPY,DIL URETHRAL STRICTURE       Current Outpatient Prescriptions   Medication Sig Dispense Refill     amphetamine-dextroamphetamine (ADDERALL) 20 MG per tablet Take 1 tablet (20 mg) by mouth 2 times daily 60 tablet 0     amphetamine-dextroamphetamine (ADDERALL) 20 MG per tablet Take 1 tablet (20 mg) by mouth 2 times daily 60 tablet 0     amphetamine-dextroamphetamine (ADDERALL) 20 MG per tablet Take 1 tablet (20 mg) by mouth 2 times daily 60 tablet 0     amphetamine-dextroamphetamine (ADDERALL) 30 MG per tablet Take 1 tablet (30 mg) by mouth 2 times daily 60 tablet 0     OTC products: None, except as noted above    Allergies   Allergen Reactions     No Known Allergies       Latex Allergy: NO    Social History   Substance Use Topics     Smoking status: Never Smoker     Smokeless tobacco: Never Used     Alcohol use No     History   Drug Use No       REVIEW OF SYSTEMS:   Constitutional, neuro, ENT, endocrine, pulmonary, cardiac, gastrointestinal, genitourinary, musculoskeletal, integument and psychiatric systems are negative, except as otherwise noted.    EXAM:   /90 (BP Location: Left arm, Patient Position: Sitting, Cuff Size: Adult Large)  Pulse 85  Temp 98.1  F (36.7  C) (Oral)  Ht 1.867 m (6' 1.5\")  Wt 105.2 kg (232 lb)  SpO2 96%  BMI 30.19 kg/m2         GENERAL APPEARANCE: healthy, alert and no distress     EYES: EOMI,  PERRL     HENT: ear canals and TM's normal and nose and mouth without ulcers or lesions     NECK: no adenopathy, no asymmetry, masses, or scars and thyroid normal to palpation     RESP: lungs clear to auscultation - no rales, rhonchi or wheezes     CV: regular rates and rhythm, normal S1 S2, no S3 or S4 and no murmur, click or rub     ABDOMEN:  soft, " nontender, no HSM or masses and bowel sounds normal     MS: extremities normal- no gross deformities noted, no evidence of inflammation in joints, FROM in all extremities.     SKIN: no suspicious lesions or rashes     NEURO: Normal strength and tone, sensory exam grossly normal, mentation intact and speech normal     PSYCH: mentation appears normal. and affect normal/bright     LYMPHATICS: No cervical adenopathy    DIAGNOSTICS:   Hemoglobin (indicated for history of anemia or procedure with significant blood loss such as tonsillectomy, major intraperitoneal surgery, vascular surgery, major spine surgery, total joint replacement)  Hemoglobin   Date Value Ref Range Status   09/10/2018 16.5 13.3 - 17.7 g/dL Final   ]      Recent Labs   Lab Test  07/05/13   1134  03/26/13   1218   HGB  15.8  15.1        IMPRESSION:   Reason for surgery/procedure: right ankle pain and broken hardware  Diagnosis/reason for consult:  Preoperative clearance      The proposed surgical procedure is considered INTERMEDIATE risk.    REVISED CARDIAC RISK INDEX  The patient has the following serious cardiovascular risks for perioperative complications such as (MI, PE, VFib and 3  AV Block):  No serious cardiac risks  INTERPRETATION: 0 risks: Class I (very low risk - 0.4% complication rate)    The patient has the following additional risks for perioperative complications:  No identified additional risks      ICD-10-CM    1. Pre-op exam Z01.818 CL AFF HEMOGLOBIN (BFP)     VENOUS COLLECTION   2. Pain in joint, ankle and foot, right M25.571    3. ADHD (attention deficit hyperactivity disorder), combined type F90.2        RECOMMENDATIONS:       Obstructive Sleep Apnea (or suspected sleep apnea)  Hospital staff are advised to monitor for sleep related oxygen desaturations due to suspicion of JOSE M    Hold all medications am of surgery.  APAP only between now and procedure    Signed Electronically by: Erica Barrios PA-C    Copy of this  evaluation report is provided to requesting physician.    [facility] Preop Guidelines    Revised Cardiac Risk Index

## 2018-09-28 ENCOUNTER — TRANSFERRED RECORDS (OUTPATIENT)
Dept: FAMILY MEDICINE | Facility: CLINIC | Age: 33
End: 2018-09-28

## 2018-10-22 DIAGNOSIS — F90.2 ADHD (ATTENTION DEFICIT HYPERACTIVITY DISORDER), COMBINED TYPE: Primary | ICD-10-CM

## 2018-10-22 NOTE — TELEPHONE ENCOUNTER
Luis Alberto Claudio is requesting a refill of:    Pending Prescriptions:                       Disp   Refills    amphetamine-dextroamphetamine (ADDERALL) *60 tab*0            Sig: Take 1 tablet (30 mg) by mouth 2 times daily    amphetamine-dextroamphetamine (ADDERALL) *60 tab*0            Sig: Take 1 tablet (30 mg) by mouth 2 times daily    amphetamine-dextroamphetamine (ADDERALL) *60 tab*0            Sig: Take 1 tablet (30 mg) by mouth 2 times daily      Can call [phone] when ready

## 2018-10-24 RX ORDER — DEXTROAMPHETAMINE SACCHARATE, AMPHETAMINE ASPARTATE, DEXTROAMPHETAMINE SULFATE AND AMPHETAMINE SULFATE 7.5; 7.5; 7.5; 7.5 MG/1; MG/1; MG/1; MG/1
30 TABLET ORAL 2 TIMES DAILY
Qty: 60 TABLET | Refills: 0 | Status: SHIPPED | OUTPATIENT
Start: 2018-12-22 | End: 2019-03-05

## 2018-10-24 RX ORDER — DEXTROAMPHETAMINE SACCHARATE, AMPHETAMINE ASPARTATE, DEXTROAMPHETAMINE SULFATE AND AMPHETAMINE SULFATE 7.5; 7.5; 7.5; 7.5 MG/1; MG/1; MG/1; MG/1
30 TABLET ORAL 2 TIMES DAILY
Qty: 60 TABLET | Refills: 0 | Status: SHIPPED | OUTPATIENT
Start: 2018-11-22 | End: 2019-03-19

## 2018-10-24 RX ORDER — DEXTROAMPHETAMINE SACCHARATE, AMPHETAMINE ASPARTATE, DEXTROAMPHETAMINE SULFATE AND AMPHETAMINE SULFATE 7.5; 7.5; 7.5; 7.5 MG/1; MG/1; MG/1; MG/1
30 TABLET ORAL 2 TIMES DAILY
Qty: 60 TABLET | Refills: 0 | Status: SHIPPED | OUTPATIENT
Start: 2018-10-24 | End: 2019-03-15

## 2019-03-05 DIAGNOSIS — F90.2 ADHD (ATTENTION DEFICIT HYPERACTIVITY DISORDER), COMBINED TYPE: ICD-10-CM

## 2019-03-05 RX ORDER — DEXTROAMPHETAMINE SACCHARATE, AMPHETAMINE ASPARTATE, DEXTROAMPHETAMINE SULFATE AND AMPHETAMINE SULFATE 7.5; 7.5; 7.5; 7.5 MG/1; MG/1; MG/1; MG/1
30 TABLET ORAL 2 TIMES DAILY
Qty: 60 TABLET | Refills: 0 | Status: SHIPPED | OUTPATIENT
Start: 2019-03-05 | End: 2019-03-15

## 2019-03-05 NOTE — TELEPHONE ENCOUNTER
Luis Alberto is due to be seen so I told him he could get a one month supply until he is seen.    Pending Prescriptions:                       Disp   Refills    amphetamine-dextroamphetamine (ADDERALL) *60 tab*0            Sig: Take 1 tablet (30 mg) by mouth 2 times daily    Please call [phone] when ready to

## 2019-03-15 ENCOUNTER — OFFICE VISIT (OUTPATIENT)
Dept: FAMILY MEDICINE | Facility: CLINIC | Age: 34
End: 2019-03-15

## 2019-03-15 VITALS
SYSTOLIC BLOOD PRESSURE: 112 MMHG | DIASTOLIC BLOOD PRESSURE: 70 MMHG | BODY MASS INDEX: 30.04 KG/M2 | TEMPERATURE: 97.2 F | WEIGHT: 230.8 LBS | HEART RATE: 78 BPM | OXYGEN SATURATION: 97 %

## 2019-03-15 DIAGNOSIS — F90.2 ADHD (ATTENTION DEFICIT HYPERACTIVITY DISORDER), COMBINED TYPE: ICD-10-CM

## 2019-03-15 PROCEDURE — 99213 OFFICE O/P EST LOW 20 MIN: CPT | Performed by: FAMILY MEDICINE

## 2019-03-15 RX ORDER — DEXTROAMPHETAMINE SACCHARATE, AMPHETAMINE ASPARTATE, DEXTROAMPHETAMINE SULFATE AND AMPHETAMINE SULFATE 7.5; 7.5; 7.5; 7.5 MG/1; MG/1; MG/1; MG/1
30 TABLET ORAL 2 TIMES DAILY
Qty: 60 TABLET | Refills: 0 | Status: SHIPPED | OUTPATIENT
Start: 2019-04-15 | End: 2019-10-23

## 2019-03-15 RX ORDER — DEXTROAMPHETAMINE SACCHARATE, AMPHETAMINE ASPARTATE, DEXTROAMPHETAMINE SULFATE AND AMPHETAMINE SULFATE 7.5; 7.5; 7.5; 7.5 MG/1; MG/1; MG/1; MG/1
30 TABLET ORAL 2 TIMES DAILY
Qty: 60 TABLET | Refills: 0 | Status: CANCELLED | OUTPATIENT
Start: 2019-03-15

## 2019-03-15 RX ORDER — DEXTROAMPHETAMINE SACCHARATE, AMPHETAMINE ASPARTATE, DEXTROAMPHETAMINE SULFATE AND AMPHETAMINE SULFATE 7.5; 7.5; 7.5; 7.5 MG/1; MG/1; MG/1; MG/1
30 TABLET ORAL 2 TIMES DAILY
Qty: 60 TABLET | Refills: 0 | Status: SHIPPED | OUTPATIENT
Start: 2019-03-15 | End: 2019-03-19

## 2019-03-15 NOTE — PROGRESS NOTES
ADHD Follow Up   SUBJECTIVE:     Are your symptoms controlled? yes  Are you satisfied with your current medication dosage? yes  Are you taking your medication regularly? yes  Are you experiencing any insomnia? no  Are you experiencing any jitteriness? no  Are you experiencing any loss of appetite or weight loss? no  Are you experiencing any other side effects? no  ROS:   CONSTITUTIONAL:NEGATIVE   RESP: NEGATIVE   CV: NEGATIVE   NEURO: NEGATIVE  OBJECTIVE:  /70 (BP Location: Right arm, Patient Position: Sitting, Cuff Size: Adult Large)   Pulse 78   Temp 97.2  F (36.2  C) (Oral)   Wt 104.7 kg (230 lb 12.8 oz)   SpO2 97%   BMI 30.04 kg/m    S1 and S2 normal, no murmurs, clicks, gallops or rubs. Regular rate and rhythm. Chest is clear; no wheezes or rales. No edema or JVD.     ASSESSMENT:  Per encounter diagnoses.    PLAN:  Per orders.    (F90.2) ADHD (attention deficit hyperactivity disorder), combined type  Comment: doing well  Plan: amphetamine-dextroamphetamine (ADDERALL) 30 MG         tablet, amphetamine-dextroamphetamine         (ADDERALL) 30 MG tablet        continue current medications at current doses

## 2019-03-15 NOTE — NURSING NOTE
Luis Alberto is here for med check    Pre-visit Screening:  Immunizations:  up to date  Colonoscopy:  NA  Mammogram: NA  Asthma Action Test/Plan:  NA  PHQ9:  None  GAD7:  None  Questioned patient about current smoking habits Pt. has never smoked.  Ok to leave detailed message on voice mail for today's visit only Yes, phone # [phone]

## 2019-03-19 ENCOUNTER — HOSPITAL ENCOUNTER (EMERGENCY)
Facility: CLINIC | Age: 34
Discharge: HOME | End: 2019-03-19
Attending: EMERGENCY MEDICINE | Admitting: EMERGENCY MEDICINE
Payer: COMMERCIAL

## 2019-03-19 VITALS
RESPIRATION RATE: 18 BRPM | OXYGEN SATURATION: 100 % | SYSTOLIC BLOOD PRESSURE: 146 MMHG | TEMPERATURE: 97.8 F | DIASTOLIC BLOOD PRESSURE: 86 MMHG

## 2019-03-19 DIAGNOSIS — R00.2 PALPITATIONS: ICD-10-CM

## 2019-03-19 PROCEDURE — 93005 ELECTROCARDIOGRAM TRACING: CPT

## 2019-03-19 PROCEDURE — 99284 EMERGENCY DEPT VISIT MOD MDM: CPT

## 2019-03-19 ASSESSMENT — ENCOUNTER SYMPTOMS
PALPITATIONS: 1
MYALGIAS: 1
NAUSEA: 1
DIAPHORESIS: 1
SHORTNESS OF BREATH: 1

## 2019-03-19 NOTE — ED AVS SNAPSHOT
[facility] Emergency Department  [address]  [facility] [address]  Phone:  [phone]  Fax:  [phone]                                    Luis Alberto Claudio   MRN: [id number]    Department:  [facility] Emergency Department   Date of Visit:  3/19/2019           After Visit Summary Signature Page    I have received my discharge instructions, and my questions have been answered. I have discussed any challenges I see with this plan with the nurse or doctor.    ..........................................................................................................................................  Patient/Patient Representative Signature      ..........................................................................................................................................  Patient Representative Print Name and Relationship to Patient    ..................................................               ................................................  Date                                   Time    ..........................................................................................................................................  Reviewed by Signature/Title    ...................................................              ..............................................  Date                                               Time          22EPIC Rev 08/18

## 2019-03-20 LAB — INTERPRETATION ECG - MUSE: NORMAL

## 2019-03-20 NOTE — ED PROVIDER NOTES
"  History     Chief Complaint:  Palpitations    HPI   Luis Alberto Claudio is an otherwise healthy 34 year old male who presents to the ED for evaluation of palpitations. The patient reports that he was spraying chemicals while working on his car approximately two hours ago, when he suddenly developed the onset of palpitations and mid left arm achiness. This was also associated with shortness of breath, diaphoresis, and nausea. The patient initially attributed his symptoms to a muscle strain and tried taking a shower without relief, and thus he presented to the ED due to the persistence of his symptoms. Here in the ED, the patient endorses persistent nausea and hand \"fuzziness,\" although his palpitations and shortness of breath have improved. He denies any recent fevers or cough. The patient additionally denies any leg swelling, recent travel, or personal history of blood clots. He has no history of panic attacks or anxiety, and he states he is otherwise healthy.    Cardiac/PE/DVT Risk Factors:  The patient has no history of hypertension, hyperlipidemia, diabetes, or smoking. He reports no family history of cardiac pathology. The patient denies any personal or familial history of PE, DVT, or clotting disorder. The patient reports no recent travel, surgery, or other immobilizations.      Allergies:  NKDA     Medications:    Adderall     Past Medical History:    ADHD  Urethral stricture    Past Surgical History:    Ankle surgery  Cystoscopy and urethral stricture dilation    Family History:    No past pertinent family history.     Social History:  Negative for tobacco use.  Negative for alcohol use.  Negative for drug use.   Marital Status:       Review of Systems   Constitutional: Positive for diaphoresis.   Respiratory: Positive for shortness of breath.    Cardiovascular: Positive for palpitations.   Gastrointestinal: Positive for nausea.   Musculoskeletal: Positive for myalgias.   All other systems reviewed " and are negative.      Physical Exam     Patient Vitals for the past 24 hrs:   BP Temp Heart Rate Resp SpO2   03/19/19 2224 146/86 -- -- -- --   03/19/19 2223 -- 97.8  F (36.6  C) 78 18 100 %        Physical Exam  Nursing note and vitals reviewed.  Constitutional: Cooperative.   HENT:   Mouth/Throat: Mucous membranes are normal.   Cardiovascular: Normal rate, regular rhythm and normal heart sounds.  No murmur.  Pulmonary/Chest: Effort normal and breath sounds normal. No respiratory distress. No wheezes. No rales.   Abdominal: Soft. Normal appearance. No distension. There is no tenderness.  Musculoskeletal: No LE edema  Neurological: Alert. Oriented x4  Skin: Skin is warm and dry.  Psychiatric: Normal mood and affect.      Emergency Department Course   ECG:  Indication: Palpitations  Time: 2314  Vent. Rate 89 bpm. OR interval 142. QRS duration 106. QT/QTc 370/450. P-R-T axis 52 21 35.  Normal sinus rhythm with sinus arrhythmia. Normal ECG. Read time: 2320     Emergency Department Course:  EKG obtained in the ED, see results above.     Nursing notes and vitals reviewed. (2319) I performed an exam of the patient as documented above. We discussed a possible laboratory workup, although the patient wished to go home at this time.    Findings and plan explained to the Patient. Patient discharged home with instructions regarding supportive care, medications, and reasons to return. The importance of close follow-up was reviewed.     I personally reviewed all  results with the Patient and answered all related questions prior to discharge.      Impression & Plan      Medical Decision Making:  Luis Alberto Claudio is a 34 year old male on Adderall for ADHD who presents with palpitations that have since resolved and some left arm aching. He states that he was working on his car and might have strained the arm. Family was concerned about possible inhalants, as he was using a rust remover. He is not hypoxic nor tachycardia. There  have been no fevers and his lung exam is completely benign. My clinical suspicion for pulmonary embolism is very low given that he is PERC negative. EKG is reassuring with no evidence of strain or arrhythmias. There is no predisposition to malignant arrhythmias including, but not limited to: Parkinson White syndrome, Brugada syndrome, or prolonged QT syndrome. I did discuss whether we would need to obtain labs. After discussion of the likelihood of these being abnormal and the time he would need to be observed in the emergency department, he is comfortable with the plan for discharge, as am I. Return precautions discussed and questions answered.     Diagnosis:    ICD-10-CM    1. Palpitations R00.2        Disposition:  discharged to home    Scribe Disclosure:  I, Jeannie Jim, am serving as a scribe on 3/19/2019 at 11:19 PM to personally document services performed by Art Payne MD based on my observations and the provider's statements to me.      Jeannie Jim  3/19/2019   [facility] EMERGENCY DEPARTMENT       Art Payne MD  03/20/19 0029

## 2019-03-20 NOTE — ED TRIAGE NOTES
"Patient presents to ED due L arm pain, SOB and palpitations .     States onset of symptoms developed \"while working on the car. My heart rate was 110 and then I was able to calm myself down\"      Denies CP, long period of travel     Appears to be anxious . C/o nausea   "

## 2019-06-19 DIAGNOSIS — F90.2 ADHD (ATTENTION DEFICIT HYPERACTIVITY DISORDER), COMBINED TYPE: Primary | ICD-10-CM

## 2019-06-19 RX ORDER — DEXTROAMPHETAMINE SACCHARATE, AMPHETAMINE ASPARTATE, DEXTROAMPHETAMINE SULFATE AND AMPHETAMINE SULFATE 7.5; 7.5; 7.5; 7.5 MG/1; MG/1; MG/1; MG/1
30 TABLET ORAL 2 TIMES DAILY
Qty: 60 TABLET | Refills: 0 | Status: SHIPPED | OUTPATIENT
Start: 2019-07-19 | End: 2019-10-23

## 2019-06-19 RX ORDER — DEXTROAMPHETAMINE SACCHARATE, AMPHETAMINE ASPARTATE, DEXTROAMPHETAMINE SULFATE AND AMPHETAMINE SULFATE 7.5; 7.5; 7.5; 7.5 MG/1; MG/1; MG/1; MG/1
30 TABLET ORAL 2 TIMES DAILY
Qty: 60 TABLET | Refills: 0 | Status: SHIPPED | OUTPATIENT
Start: 2019-06-19 | End: 2019-10-23

## 2019-06-19 RX ORDER — DEXTROAMPHETAMINE SACCHARATE, AMPHETAMINE ASPARTATE, DEXTROAMPHETAMINE SULFATE AND AMPHETAMINE SULFATE 7.5; 7.5; 7.5; 7.5 MG/1; MG/1; MG/1; MG/1
30 TABLET ORAL 2 TIMES DAILY
Qty: 60 TABLET | Refills: 0 | Status: SHIPPED | OUTPATIENT
Start: 2019-08-19 | End: 2019-10-10

## 2019-06-19 NOTE — TELEPHONE ENCOUNTER
Luis Alberto called requesting the next 3 months of his Adderall.  Last OV for ADHD was 3/15/19.      Fanny, could you review for Dr. Gamino? Thanks.      Patient's #: [phone]          Pending Prescriptions:                       Disp   Refills    amphetamine-dextroamphetamine (ADDERALL) *60 tab*0            Sig: Take 1 tablet (30 mg) by mouth 2 times daily    amphetamine-dextroamphetamine (ADDERALL) *60 tab*0            Sig: Take 1 tablet (30 mg) by mouth 2 times daily    amphetamine-dextroamphetamine (ADDERALL) *60 tab*0            Sig: Take 1 tablet (30 mg) by mouth 2 times daily

## 2019-10-09 ENCOUNTER — TELEPHONE (OUTPATIENT)
Dept: FAMILY MEDICINE | Facility: CLINIC | Age: 34
End: 2019-10-09

## 2019-10-09 NOTE — TELEPHONE ENCOUNTER
Patient called in and requested a refill on a medication for himself and his children. However, all the patients are due for an office visit. I called the patient and informed him on a detailed message.

## 2019-10-10 DIAGNOSIS — F90.2 ADHD (ATTENTION DEFICIT HYPERACTIVITY DISORDER), COMBINED TYPE: ICD-10-CM

## 2019-10-10 RX ORDER — DEXTROAMPHETAMINE SACCHARATE, AMPHETAMINE ASPARTATE, DEXTROAMPHETAMINE SULFATE AND AMPHETAMINE SULFATE 7.5; 7.5; 7.5; 7.5 MG/1; MG/1; MG/1; MG/1
30 TABLET ORAL 2 TIMES DAILY
Qty: 60 TABLET | Refills: 0 | Status: SHIPPED | OUTPATIENT
Start: 2019-10-10 | End: 2019-10-23

## 2019-10-10 NOTE — TELEPHONE ENCOUNTER
Luis Alberto Jackson Sutton called the clinic support line with the following:    Is out of his medication and has made an OV for 10/23/19. Are you willing to fill?    Please advise  Luis Alberto Claudio is requesting a refill of:    Pending Prescriptions:                       Disp   Refills    amphetamine-dextroamphetamine (ADDERALL) *60 tab*             Sig: Take 1 tablet (30 mg) by mouth 2 times daily

## 2019-10-23 ENCOUNTER — OFFICE VISIT (OUTPATIENT)
Dept: FAMILY MEDICINE | Facility: CLINIC | Age: 34
End: 2019-10-23

## 2019-10-23 VITALS
BODY MASS INDEX: 30.53 KG/M2 | TEMPERATURE: 98.7 F | DIASTOLIC BLOOD PRESSURE: 80 MMHG | RESPIRATION RATE: 20 BRPM | WEIGHT: 234.6 LBS | HEART RATE: 84 BPM | SYSTOLIC BLOOD PRESSURE: 136 MMHG

## 2019-10-23 DIAGNOSIS — F90.2 ADHD (ATTENTION DEFICIT HYPERACTIVITY DISORDER), COMBINED TYPE: Primary | ICD-10-CM

## 2019-10-23 PROCEDURE — 99213 OFFICE O/P EST LOW 20 MIN: CPT | Mod: 25 | Performed by: FAMILY MEDICINE

## 2019-10-23 PROCEDURE — 90686 IIV4 VACC NO PRSV 0.5 ML IM: CPT | Performed by: FAMILY MEDICINE

## 2019-10-23 PROCEDURE — 90471 IMMUNIZATION ADMIN: CPT | Performed by: FAMILY MEDICINE

## 2019-10-23 RX ORDER — DEXTROAMPHETAMINE SACCHARATE, AMPHETAMINE ASPARTATE, DEXTROAMPHETAMINE SULFATE AND AMPHETAMINE SULFATE 7.5; 7.5; 7.5; 7.5 MG/1; MG/1; MG/1; MG/1
30 TABLET ORAL 2 TIMES DAILY
Qty: 60 TABLET | Refills: 0 | Status: SHIPPED | OUTPATIENT
Start: 2019-10-23 | End: 2020-03-11

## 2019-10-23 NOTE — PROGRESS NOTES
ADHD Follow Up   SUBJECTIVE:     Are your symptoms controlled? yes  Are you satisfied with your current medication dosage? yes  Are you taking your medication regularly? yes  Are you experiencing any insomnia? no  Are you experiencing any jitteriness? no  Are you experiencing any loss of appetite or weight loss? no  Are you experiencing any other side effects? no  ROS:   CONSTITUTIONAL:NEGATIVE   RESP: NEGATIVE   CV: NEGATIVE   NEURO: NEGATIVE  OBJECTIVE:  /80 (BP Location: Right arm, Patient Position: Chair, Cuff Size: Adult Regular)   Pulse 84   Temp 98.7  F (37.1  C) (Oral)   Resp 20   Wt 106.4 kg (234 lb 9.6 oz)   BMI 30.53 kg/m    S1 and S2 normal, no murmurs, clicks, gallops or rubs. Regular rate and rhythm. Chest is clear; no wheezes or rales. No edema or JVD.     ASSESSMENT:  Per encounter diagnoses.    PLAN:  Per orders.    (F90.2) ADHD (attention deficit hyperactivity disorder), combined type  (primary encounter diagnosis)  Comment: well controlled  Plan: continue current medications at current doses

## 2019-10-23 NOTE — NURSING NOTE
Luis Alberto Claudio is here for a medication refill.  Questioned patient about current smoking habits.  Pt. has never smoked.  PULSE regular  My Chart:   CLASSIFICATION OF OVERWEIGHT AND OBESITY BY BMI                        Obesity Class           BMI(kg/m2)  Underweight                                    < 18.5  Normal                                         18.5-24.9  Overweight                                     25.0-29.9  OBESITY                     I                  30.0-34.9                             II                 35.0-39.9  EXTREME OBESITY             III                >40                            Patient's  BMI Body mass index is 30.53 kg/m .  http://hin.nhlbi.nih.gov/menuplanner/menu.cgi  Pre-visit planning  Immunizations - up to date  Colonoscopy -   Mammogram -   Asthma -   PHQ9 -    LAURIE-7 -

## 2020-02-03 ENCOUNTER — TELEPHONE (OUTPATIENT)
Dept: FAMILY MEDICINE | Facility: CLINIC | Age: 35
End: 2020-02-03

## 2020-03-11 ENCOUNTER — OFFICE VISIT (OUTPATIENT)
Dept: FAMILY MEDICINE | Facility: CLINIC | Age: 35
End: 2020-03-11

## 2020-03-11 VITALS
DIASTOLIC BLOOD PRESSURE: 90 MMHG | HEIGHT: 72 IN | BODY MASS INDEX: 32.15 KG/M2 | SYSTOLIC BLOOD PRESSURE: 138 MMHG | TEMPERATURE: 97.9 F | RESPIRATION RATE: 20 BRPM | WEIGHT: 237.4 LBS | HEART RATE: 92 BPM

## 2020-03-11 DIAGNOSIS — F90.2 ADHD (ATTENTION DEFICIT HYPERACTIVITY DISORDER), COMBINED TYPE: Primary | ICD-10-CM

## 2020-03-11 DIAGNOSIS — Z71.89 ACP (ADVANCE CARE PLANNING): ICD-10-CM

## 2020-03-11 DIAGNOSIS — R03.0 ELEVATED BLOOD PRESSURE READING WITHOUT DIAGNOSIS OF HYPERTENSION: ICD-10-CM

## 2020-03-11 PROCEDURE — 99212 OFFICE O/P EST SF 10 MIN: CPT | Performed by: FAMILY MEDICINE

## 2020-03-11 RX ORDER — DEXTROAMPHETAMINE SACCHARATE, AMPHETAMINE ASPARTATE, DEXTROAMPHETAMINE SULFATE AND AMPHETAMINE SULFATE 7.5; 7.5; 7.5; 7.5 MG/1; MG/1; MG/1; MG/1
30 TABLET ORAL 2 TIMES DAILY
Qty: 60 TABLET | Refills: 0 | Status: SHIPPED | OUTPATIENT
Start: 2020-05-11 | End: 2023-05-18

## 2020-03-11 RX ORDER — DEXTROAMPHETAMINE SACCHARATE, AMPHETAMINE ASPARTATE, DEXTROAMPHETAMINE SULFATE AND AMPHETAMINE SULFATE 7.5; 7.5; 7.5; 7.5 MG/1; MG/1; MG/1; MG/1
30 TABLET ORAL 2 TIMES DAILY
Qty: 60 TABLET | Refills: 0 | Status: SHIPPED | OUTPATIENT
Start: 2020-04-11 | End: 2020-09-14

## 2020-03-11 RX ORDER — DEXTROAMPHETAMINE SACCHARATE, AMPHETAMINE ASPARTATE, DEXTROAMPHETAMINE SULFATE AND AMPHETAMINE SULFATE 7.5; 7.5; 7.5; 7.5 MG/1; MG/1; MG/1; MG/1
30 TABLET ORAL 2 TIMES DAILY
Qty: 60 TABLET | Refills: 0 | Status: SHIPPED | OUTPATIENT
Start: 2020-03-11 | End: 2020-09-14

## 2020-03-11 ASSESSMENT — MIFFLIN-ST. JEOR: SCORE: 2058.81

## 2020-03-11 NOTE — NURSING NOTE
Luis Alberto Claudio is here for a medication check and refill.    Questioned patient about current smoking habits.  Pt. has never smoked.  PULSE regular  My Chart:   CLASSIFICATION OF OVERWEIGHT AND OBESITY BY BMI                        Obesity Class           BMI(kg/m2)  Underweight                                    < 18.5  Normal                                         18.5-24.9  Overweight                                     25.0-29.9  OBESITY                     I                  30.0-34.9                             II                 35.0-39.9  EXTREME OBESITY             III                >40                            Patient's  BMI Body mass index is 31.97 kg/m .  http://hin.nhlbi.nih.gov/menuplanner/menu.cgi  Pre-visit planning  Immunizations - up to date  Colonoscopy -   Mammogram -   Asthma -   PHQ9 -    LAURIE-7 -

## 2020-03-11 NOTE — PROGRESS NOTES
"ADHD Follow Up   SUBJECTIVE:     Are your symptoms controlled? yes  Are you satisfied with your current medication dosage? yes  Are you taking your medication regularly? yes  Are you experiencing any insomnia? no  Are you experiencing any jitteriness? no  Are you experiencing any loss of appetite or weight loss? no  Are you experiencing any other side effects? no  ROS:   CONSTITUTIONAL:NEGATIVE   RESP: NEGATIVE   CV: NEGATIVE   NEURO: NEGATIVE  OBJECTIVE:  BP (!) 138/90 (BP Location: Right arm, Patient Position: Chair, Cuff Size: Adult Large)   Pulse 92   Temp 97.9  F (36.6  C) (Oral)   Resp 20   Ht 1.835 m (6' 0.25\")   Wt 107.7 kg (237 lb 6.4 oz)   BMI 31.97 kg/m    S1 and S2 normal, no murmurs, clicks, gallops or rubs. Regular rate and rhythm. Chest is clear; no wheezes or rales. No edema or JVD.     ASSESSMENT:  Per encounter diagnoses.    PLAN:  Per orders.    (F90.2) ADHD (attention deficit hyperactivity disorder), combined type  Comment: well controlled  Plan: amphetamine-dextroamphetamine (ADDERALL) 30 MG         tablet, amphetamine-dextroamphetamine         (ADDERALL) 30 MG tablet,         amphetamine-dextroamphetamine (ADDERALL) 30 MG         tablet        continue current medications at current doses    "

## 2020-06-12 DIAGNOSIS — F90.2 ADHD (ATTENTION DEFICIT HYPERACTIVITY DISORDER), COMBINED TYPE: ICD-10-CM

## 2020-06-12 RX ORDER — DEXTROAMPHETAMINE SACCHARATE, AMPHETAMINE ASPARTATE, DEXTROAMPHETAMINE SULFATE AND AMPHETAMINE SULFATE 7.5; 7.5; 7.5; 7.5 MG/1; MG/1; MG/1; MG/1
30 TABLET ORAL 2 TIMES DAILY
Qty: 60 TABLET | Refills: 0 | Status: SHIPPED | OUTPATIENT
Start: 2020-06-12 | End: 2020-07-12

## 2020-06-12 RX ORDER — DEXTROAMPHETAMINE SACCHARATE, AMPHETAMINE ASPARTATE, DEXTROAMPHETAMINE SULFATE AND AMPHETAMINE SULFATE 7.5; 7.5; 7.5; 7.5 MG/1; MG/1; MG/1; MG/1
30 TABLET ORAL 2 TIMES DAILY
Qty: 60 TABLET | Refills: 0 | Status: SHIPPED | OUTPATIENT
Start: 2020-08-13 | End: 2020-09-12

## 2020-06-12 RX ORDER — DEXTROAMPHETAMINE SACCHARATE, AMPHETAMINE ASPARTATE, DEXTROAMPHETAMINE SULFATE AND AMPHETAMINE SULFATE 7.5; 7.5; 7.5; 7.5 MG/1; MG/1; MG/1; MG/1
30 TABLET ORAL 2 TIMES DAILY
Qty: 60 TABLET | Refills: 0 | Status: SHIPPED | OUTPATIENT
Start: 2020-07-13 | End: 2020-08-12

## 2020-06-12 RX ORDER — DEXTROAMPHETAMINE SACCHARATE, AMPHETAMINE ASPARTATE, DEXTROAMPHETAMINE SULFATE AND AMPHETAMINE SULFATE 7.5; 7.5; 7.5; 7.5 MG/1; MG/1; MG/1; MG/1
30 TABLET ORAL 2 TIMES DAILY
Qty: 60 TABLET | Refills: 0 | Status: CANCELLED | OUTPATIENT
Start: 2020-06-12

## 2020-09-14 ENCOUNTER — OFFICE VISIT (OUTPATIENT)
Dept: FAMILY MEDICINE | Facility: CLINIC | Age: 35
End: 2020-09-14

## 2020-09-14 VITALS
DIASTOLIC BLOOD PRESSURE: 86 MMHG | OXYGEN SATURATION: 97 % | BODY MASS INDEX: 31.4 KG/M2 | WEIGHT: 231.8 LBS | SYSTOLIC BLOOD PRESSURE: 130 MMHG | HEIGHT: 72 IN | TEMPERATURE: 98.7 F | HEART RATE: 90 BPM

## 2020-09-14 DIAGNOSIS — F90.2 ADHD (ATTENTION DEFICIT HYPERACTIVITY DISORDER), COMBINED TYPE: ICD-10-CM

## 2020-09-14 PROCEDURE — 90686 IIV4 VACC NO PRSV 0.5 ML IM: CPT | Performed by: FAMILY MEDICINE

## 2020-09-14 PROCEDURE — 90471 IMMUNIZATION ADMIN: CPT | Performed by: FAMILY MEDICINE

## 2020-09-14 PROCEDURE — 99213 OFFICE O/P EST LOW 20 MIN: CPT | Mod: 25 | Performed by: FAMILY MEDICINE

## 2020-09-14 RX ORDER — DEXTROAMPHETAMINE SACCHARATE, AMPHETAMINE ASPARTATE, DEXTROAMPHETAMINE SULFATE AND AMPHETAMINE SULFATE 7.5; 7.5; 7.5; 7.5 MG/1; MG/1; MG/1; MG/1
30 TABLET ORAL 2 TIMES DAILY
Status: CANCELLED | OUTPATIENT
Start: 2020-09-14

## 2020-09-14 RX ORDER — DEXTROAMPHETAMINE SACCHARATE, AMPHETAMINE ASPARTATE, DEXTROAMPHETAMINE SULFATE AND AMPHETAMINE SULFATE 7.5; 7.5; 7.5; 7.5 MG/1; MG/1; MG/1; MG/1
30 TABLET ORAL 2 TIMES DAILY
Qty: 60 TABLET | Refills: 0 | Status: SHIPPED | OUTPATIENT
Start: 2020-11-15 | End: 2020-12-15

## 2020-09-14 RX ORDER — DEXTROAMPHETAMINE SACCHARATE, AMPHETAMINE ASPARTATE, DEXTROAMPHETAMINE SULFATE AND AMPHETAMINE SULFATE 7.5; 7.5; 7.5; 7.5 MG/1; MG/1; MG/1; MG/1
30 TABLET ORAL 2 TIMES DAILY
Qty: 60 TABLET | Refills: 0 | Status: SHIPPED | OUTPATIENT
Start: 2020-09-14 | End: 2020-10-14

## 2020-09-14 RX ORDER — DEXTROAMPHETAMINE SACCHARATE, AMPHETAMINE ASPARTATE, DEXTROAMPHETAMINE SULFATE AND AMPHETAMINE SULFATE 7.5; 7.5; 7.5; 7.5 MG/1; MG/1; MG/1; MG/1
30 TABLET ORAL 2 TIMES DAILY
Qty: 60 TABLET | Refills: 0 | Status: SHIPPED | OUTPATIENT
Start: 2020-10-15 | End: 2020-11-14

## 2020-09-14 ASSESSMENT — MIFFLIN-ST. JEOR: SCORE: 2033.31

## 2020-09-14 NOTE — NURSING NOTE
Luis Alberto Is here for a med check, nonfasting.    Pre-Visit Screening:  Immunizations:UTD  Colonoscopy:NA  Mammogram:NA  Asthma Action Test/Plan:NA  PHQ9:PHQ-2 done  GAD7:NA  Questioned patient about current smoking habits Pt.never smoked  OK to leave a detailed message on voice mail for today's visit yes, phone # [phone]

## 2020-09-14 NOTE — PROGRESS NOTES
"ADHD Follow Up   SUBJECTIVE:     Are your symptoms controlled? Not as well some days but has been eating differently and working on weight loss which may affect  Are you satisfied with your current medication dosage? maybe  Are you taking your medication regularly? yes  Are you experiencing any insomnia? no  Are you experiencing any jitteriness? no  Are you experiencing any loss of appetite or weight loss? no  Are you experiencing any other side effects? no  ROS:   CONSTITUTIONAL:NEGATIVE   RESP: NEGATIVE   CV: NEGATIVE   NEURO: NEGATIVE  OBJECTIVE:  /86 (BP Location: Right arm, Patient Position: Sitting, Cuff Size: Adult Large)   Pulse 90   Temp 98.7  F (37.1  C) (Oral)   Ht 1.835 m (6' 0.24\")   Wt 105.1 kg (231 lb 12.8 oz)   SpO2 97%   BMI 31.23 kg/m    S1 and S2 normal, no murmurs, clicks, gallops or rubs. Regular rate and rhythm. Chest is clear; no wheezes or rales. No edema or JVD.     ASSESSMENT:  Per encounter diagnoses.    PLAN:  Per orders.    (F90.2) ADHD (attention deficit hyperactivity disorder), combined type  Comment: stable, has some days where it does nto work as well, discussed options to change meds, try XR, or stay the course and see if things stabilize  Plan: amphetamine-dextroamphetamine (ADDERALL) 30 MG         tablet        He elects to stay on this for now, this is max dose so would not increase further   "

## 2020-12-21 DIAGNOSIS — F90.2 ADHD (ATTENTION DEFICIT HYPERACTIVITY DISORDER), COMBINED TYPE: ICD-10-CM

## 2020-12-21 RX ORDER — DEXTROAMPHETAMINE SACCHARATE, AMPHETAMINE ASPARTATE, DEXTROAMPHETAMINE SULFATE AND AMPHETAMINE SULFATE 7.5; 7.5; 7.5; 7.5 MG/1; MG/1; MG/1; MG/1
30 TABLET ORAL 2 TIMES DAILY
Qty: 60 TABLET | Refills: 0 | Status: CANCELLED | OUTPATIENT
Start: 2020-12-21

## 2020-12-21 NOTE — TELEPHONE ENCOUNTER
Luis Alberto is requesting the next three months of Adderall.  Pt last seen on 9/14/2020.  He takes 30 mg immediate release 2X daily.

## 2020-12-22 RX ORDER — DEXTROAMPHETAMINE SACCHARATE, AMPHETAMINE ASPARTATE, DEXTROAMPHETAMINE SULFATE AND AMPHETAMINE SULFATE 7.5; 7.5; 7.5; 7.5 MG/1; MG/1; MG/1; MG/1
30 TABLET ORAL 2 TIMES DAILY
Qty: 60 TABLET | Refills: 0 | Status: SHIPPED | OUTPATIENT
Start: 2021-02-22 | End: 2021-03-24

## 2020-12-22 RX ORDER — DEXTROAMPHETAMINE SACCHARATE, AMPHETAMINE ASPARTATE, DEXTROAMPHETAMINE SULFATE AND AMPHETAMINE SULFATE 7.5; 7.5; 7.5; 7.5 MG/1; MG/1; MG/1; MG/1
30 TABLET ORAL 2 TIMES DAILY
Qty: 60 TABLET | Refills: 0 | Status: SHIPPED | OUTPATIENT
Start: 2021-01-22 | End: 2021-02-21

## 2020-12-22 RX ORDER — DEXTROAMPHETAMINE SACCHARATE, AMPHETAMINE ASPARTATE, DEXTROAMPHETAMINE SULFATE AND AMPHETAMINE SULFATE 7.5; 7.5; 7.5; 7.5 MG/1; MG/1; MG/1; MG/1
30 TABLET ORAL 2 TIMES DAILY
Qty: 60 TABLET | Refills: 0 | Status: SHIPPED | OUTPATIENT
Start: 2020-12-22 | End: 2021-01-21

## 2021-04-05 ENCOUNTER — OFFICE VISIT (OUTPATIENT)
Dept: FAMILY MEDICINE | Facility: CLINIC | Age: 36
End: 2021-04-05

## 2021-04-05 VITALS
SYSTOLIC BLOOD PRESSURE: 130 MMHG | DIASTOLIC BLOOD PRESSURE: 92 MMHG | HEIGHT: 72 IN | WEIGHT: 231 LBS | TEMPERATURE: 98.4 F | BODY MASS INDEX: 31.29 KG/M2 | HEART RATE: 80 BPM | RESPIRATION RATE: 20 BRPM

## 2021-04-05 DIAGNOSIS — D22.9 SKIN MOLE: ICD-10-CM

## 2021-04-05 DIAGNOSIS — B07.8 COMMON WART: ICD-10-CM

## 2021-04-05 DIAGNOSIS — F90.2 ADHD (ATTENTION DEFICIT HYPERACTIVITY DISORDER), COMBINED TYPE: Primary | ICD-10-CM

## 2021-04-05 PROCEDURE — 99213 OFFICE O/P EST LOW 20 MIN: CPT | Mod: 25 | Performed by: FAMILY MEDICINE

## 2021-04-05 PROCEDURE — 17110 DESTRUCTION B9 LES UP TO 14: CPT | Performed by: FAMILY MEDICINE

## 2021-04-05 RX ORDER — DEXTROAMPHETAMINE SACCHARATE, AMPHETAMINE ASPARTATE, DEXTROAMPHETAMINE SULFATE AND AMPHETAMINE SULFATE 7.5; 7.5; 7.5; 7.5 MG/1; MG/1; MG/1; MG/1
30 TABLET ORAL 2 TIMES DAILY
Qty: 60 TABLET | Refills: 0 | Status: SHIPPED | OUTPATIENT
Start: 2021-04-05 | End: 2021-05-05

## 2021-04-05 RX ORDER — DEXTROAMPHETAMINE SACCHARATE, AMPHETAMINE ASPARTATE, DEXTROAMPHETAMINE SULFATE AND AMPHETAMINE SULFATE 7.5; 7.5; 7.5; 7.5 MG/1; MG/1; MG/1; MG/1
30 TABLET ORAL 2 TIMES DAILY
Qty: 60 TABLET | Refills: 0 | Status: SHIPPED | OUTPATIENT
Start: 2021-05-06 | End: 2021-06-05

## 2021-04-05 RX ORDER — DEXTROAMPHETAMINE SACCHARATE, AMPHETAMINE ASPARTATE, DEXTROAMPHETAMINE SULFATE AND AMPHETAMINE SULFATE 7.5; 7.5; 7.5; 7.5 MG/1; MG/1; MG/1; MG/1
30 TABLET ORAL 2 TIMES DAILY
Qty: 60 TABLET | Refills: 0 | Status: SHIPPED | OUTPATIENT
Start: 2021-06-06 | End: 2021-07-06

## 2021-04-05 ASSESSMENT — MIFFLIN-ST. JEOR: SCORE: 2024.78

## 2021-04-05 NOTE — PROGRESS NOTES
"ADHD Follow Up   SUBJECTIVE:     adderall 30 IR BID    Are your symptoms controlled? yes  Are you satisfied with your current medication dosage? yes  Are you taking your medication regularly? yes  Are you experiencing any insomnia? no  Are you experiencing any jitteriness? no  Are you experiencing any loss of appetite or weight loss? no  Are you experiencing any other side effects? no  ROS:   CONSTITUTIONAL:NEGATIVE   RESP: NEGATIVE   CV: NEGATIVE   NEURO: NEGATIVE    Pt has had \"wart\" right thigh for a year- has tried OTC salicyclic acid and cryo and no response    also mole on ear that is catching on clothing    OBJECTIVE:  BP (!) 130/92   Pulse 80   Temp 98.4  F (36.9  C)   Ht 1.835 m (6' 0.25\")   Wt 104.8 kg (231 lb)   BMI 31.11 kg/m    S1 and S2 normal, no murmurs, clicks, gallops or rubs. Regular rate and rhythm. Chest is clear; no wheezes or rales. No edema or JVD.     Skin: right ear pinna with raised nevus    Right medial thigh-raised verrucous appearing lesion      ASSESSMENT:  (F90.2) ADHD (attention deficit hyperactivity disorder), combined type  (primary encounter diagnosis)  Comment: well controlled, continue current medications at current doses   Plan: amphetamine-dextroamphetamine (ADDERALL) 30 MG         tablet, amphetamine-dextroamphetamine         (ADDERALL) 30 MG tablet,         amphetamine-dextroamphetamine (ADDERALL) 30 MG         tablet        Recheck 6 mo    (D22.9) Skin mole  Comment: raised lesion on pinna- cartilage underneath- symptomatic  Plan: DERMATOLOGY ADULT REFERRAL        Recommend derm for removal    (B07.8) Common wart  Comment: likely wart although dermatofibroma cannot be excluded-he would like cryo here  Plan: DESTRUCT BENIGN LESION, UP TO 14, DERMATOLOGY         ADULT REFERRAL        Liquid nitrogen was applied for 5-10 seconds x3  to the skin lesions and the expected blistering or scabbing reaction explained. Do not pick at the areas. Patient reminded to expect " hypopigmented scars from the procedure. Return if lesions fail to fully resolve.     Recommend derm for f/u given above issue as well

## 2021-04-05 NOTE — NURSING NOTE
Luis Alberto Claudio is here for a medication check and refill.  Questioned patient about current smoking habits.  Pt. has never smoked.  PULSE regular  My Chart:   CLASSIFICATION OF OVERWEIGHT AND OBESITY BY BMI                        Obesity Class           BMI(kg/m2)  Underweight                                    < 18.5  Normal                                         18.5-24.9  Overweight                                     25.0-29.9  OBESITY                     I                  30.0-34.9                             II                 35.0-39.9  EXTREME OBESITY             III                >40                            Patient's  BMI Body mass index is 31.11 kg/m .  http://hin.nhlbi.nih.gov/menuplanner/menu.cgi  Pre-visit planning  Immunizations - up to date  Colonoscopy -   Mammogram -   Asthma -   PHQ9 -    LAURIE-7 -    Hearing Test -

## 2021-07-14 DIAGNOSIS — F90.2 ADHD (ATTENTION DEFICIT HYPERACTIVITY DISORDER), COMBINED TYPE: ICD-10-CM

## 2021-07-14 RX ORDER — DEXTROAMPHETAMINE SACCHARATE, AMPHETAMINE ASPARTATE MONOHYDRATE, DEXTROAMPHETAMINE SULFATE AND AMPHETAMINE SULFATE 7.5; 7.5; 7.5; 7.5 MG/1; MG/1; MG/1; MG/1
30 CAPSULE, EXTENDED RELEASE ORAL 2 TIMES DAILY
Qty: 60 CAPSULE | Refills: 0 | Status: SHIPPED | OUTPATIENT
Start: 2021-07-14 | End: 2021-08-13

## 2021-07-14 RX ORDER — DEXTROAMPHETAMINE SACCHARATE, AMPHETAMINE ASPARTATE MONOHYDRATE, DEXTROAMPHETAMINE SULFATE AND AMPHETAMINE SULFATE 7.5; 7.5; 7.5; 7.5 MG/1; MG/1; MG/1; MG/1
30 CAPSULE, EXTENDED RELEASE ORAL 2 TIMES DAILY
Qty: 60 CAPSULE | Refills: 0 | Status: SHIPPED | OUTPATIENT
Start: 2021-09-14 | End: 2021-10-14

## 2021-07-14 RX ORDER — DEXTROAMPHETAMINE SACCHARATE, AMPHETAMINE ASPARTATE MONOHYDRATE, DEXTROAMPHETAMINE SULFATE AND AMPHETAMINE SULFATE 7.5; 7.5; 7.5; 7.5 MG/1; MG/1; MG/1; MG/1
30 CAPSULE, EXTENDED RELEASE ORAL 2 TIMES DAILY
Qty: 60 CAPSULE | Refills: 0 | Status: SHIPPED | OUTPATIENT
Start: 2021-08-14 | End: 2021-09-13

## 2021-07-14 NOTE — TELEPHONE ENCOUNTER
Patient called in asking for his refills of  Pending Prescriptions:                       Disp   Refills    amphetamine-dextroamphetamine (ADDERALL X*60 cap*0            Sig: Take 1 capsule (30 mg) by mouth 2 times daily    amphetamine-dextroamphetamine (ADDERALL X*60 cap*0            Sig: Take 1 capsule (30 mg) by mouth 2 times daily    amphetamine-dextroamphetamine (ADDERALL X*60 cap*0            Sig: Take 1 capsule (30 mg) by mouth 2 times daily    He was last seen in April for his medication check appt so he is up to date. Routing to Dr. Gamino for approval or denial of refill request.

## 2021-07-15 RX ORDER — DEXTROAMPHETAMINE SACCHARATE, AMPHETAMINE ASPARTATE, DEXTROAMPHETAMINE SULFATE AND AMPHETAMINE SULFATE 7.5; 7.5; 7.5; 7.5 MG/1; MG/1; MG/1; MG/1
30 TABLET ORAL 2 TIMES DAILY
Qty: 60 TABLET | Refills: 0 | Status: SHIPPED | OUTPATIENT
Start: 2021-07-15 | End: 2021-08-14

## 2021-07-15 RX ORDER — DEXTROAMPHETAMINE SACCHARATE, AMPHETAMINE ASPARTATE, DEXTROAMPHETAMINE SULFATE AND AMPHETAMINE SULFATE 7.5; 7.5; 7.5; 7.5 MG/1; MG/1; MG/1; MG/1
30 TABLET ORAL 2 TIMES DAILY
Qty: 60 TABLET | Refills: 0 | Status: SHIPPED | OUTPATIENT
Start: 2021-09-15 | End: 2021-10-15

## 2021-07-15 RX ORDER — DEXTROAMPHETAMINE SACCHARATE, AMPHETAMINE ASPARTATE, DEXTROAMPHETAMINE SULFATE AND AMPHETAMINE SULFATE 7.5; 7.5; 7.5; 7.5 MG/1; MG/1; MG/1; MG/1
30 TABLET ORAL 2 TIMES DAILY
Qty: 60 TABLET | Refills: 0 | Status: SHIPPED | OUTPATIENT
Start: 2021-08-15 | End: 2021-09-14

## 2021-07-15 NOTE — TELEPHONE ENCOUNTER
Carlota contacted the clinic wanting to know if the Adderall 30 mg XR BID is an error? Pt has taken 30 mg immediate release BID in the past. Was there a recent change?  I have the immediate release pending.  Please advise, thanks.

## 2021-10-22 ENCOUNTER — OFFICE VISIT (OUTPATIENT)
Dept: FAMILY MEDICINE | Facility: CLINIC | Age: 36
End: 2021-10-22

## 2021-10-22 VITALS
WEIGHT: 225 LBS | DIASTOLIC BLOOD PRESSURE: 80 MMHG | TEMPERATURE: 97.7 F | HEIGHT: 74 IN | OXYGEN SATURATION: 98 % | SYSTOLIC BLOOD PRESSURE: 122 MMHG | HEART RATE: 72 BPM | BODY MASS INDEX: 28.88 KG/M2

## 2021-10-22 DIAGNOSIS — F90.2 ADHD (ATTENTION DEFICIT HYPERACTIVITY DISORDER), COMBINED TYPE: Primary | ICD-10-CM

## 2021-10-22 PROCEDURE — 90471 IMMUNIZATION ADMIN: CPT | Performed by: PHYSICIAN ASSISTANT

## 2021-10-22 PROCEDURE — 90686 IIV4 VACC NO PRSV 0.5 ML IM: CPT | Performed by: PHYSICIAN ASSISTANT

## 2021-10-22 PROCEDURE — 99213 OFFICE O/P EST LOW 20 MIN: CPT | Mod: 25 | Performed by: PHYSICIAN ASSISTANT

## 2021-10-22 RX ORDER — DEXTROAMPHETAMINE SACCHARATE, AMPHETAMINE ASPARTATE, DEXTROAMPHETAMINE SULFATE AND AMPHETAMINE SULFATE 7.5; 7.5; 7.5; 7.5 MG/1; MG/1; MG/1; MG/1
30 TABLET ORAL DAILY
Qty: 60 TABLET | Refills: 0 | Status: SHIPPED | OUTPATIENT
Start: 2021-10-22 | End: 2021-12-21

## 2021-10-22 RX ORDER — DEXTROAMPHETAMINE SACCHARATE, AMPHETAMINE ASPARTATE, DEXTROAMPHETAMINE SULFATE AND AMPHETAMINE SULFATE 7.5; 7.5; 7.5; 7.5 MG/1; MG/1; MG/1; MG/1
30 TABLET ORAL 2 TIMES DAILY
Qty: 60 TABLET | Refills: 0 | Status: SHIPPED | OUTPATIENT
Start: 2021-11-22 | End: 2021-12-22

## 2021-10-22 RX ORDER — DEXTROAMPHETAMINE SACCHARATE, AMPHETAMINE ASPARTATE, DEXTROAMPHETAMINE SULFATE AND AMPHETAMINE SULFATE 7.5; 7.5; 7.5; 7.5 MG/1; MG/1; MG/1; MG/1
30 TABLET ORAL 2 TIMES DAILY
Qty: 60 TABLET | Refills: 0 | Status: SHIPPED | OUTPATIENT
Start: 2021-12-23 | End: 2022-01-22

## 2021-10-22 ASSESSMENT — MIFFLIN-ST. JEOR: SCORE: 2017.4

## 2021-10-22 NOTE — PROGRESS NOTES
"CC: Medication Check    History:  ADHD:  Luis Alberto has been taking Adderall IR 30 mg twice daily for several years. Takes both tablets every day, no drug holiday. Feels like this is working well, no side effects. No changes with weight, appetite.     PMH, MEDICATIONS, ALLERGIES, SOCIAL AND FAMILY HISTORY in [facility] and reviewed by me personally.    ROS negative other than the symptoms noted above in the HPI.    Examination   /80 (BP Location: Right arm, Patient Position: Sitting, Cuff Size: Adult Large)   Pulse 72   Temp 97.7  F (36.5  C) (Temporal)   Ht 1.867 m (6' 1.5\")   Wt 102.1 kg (225 lb)   SpO2 98%   BMI 29.28 kg/m       Constitutional: Sitting comfortably, in no acute distress. Vital signs noted  Neck:  no adenopathy, trachea midline and normal to palpation, thyroid normal to palpation  Cardiovascular:  regular rate and rhythm, no murmurs, clicks, or gallops  Respiratory:  normal respiratory rate and rhythm, lungs clear to auscultation  SKIN: No jaundice/pallor/rash.   Psychiatric: mentation appears normal and affect normal/bright      A/P    ICD-10-CM    1. ADHD (attention deficit hyperactivity disorder), combined type  F90.2 amphetamine-dextroamphetamine (ADDERALL) 30 MG tablet     amphetamine-dextroamphetamine (ADDERALL) 30 MG tablet     amphetamine-dextroamphetamine (ADDERALL) 30 MG tablet       DISCUSSION:  ADHD:  Luis Alberto is doing well on current dosage. Will refill for 3 months at same dose. He will contact us at that time for 3 additional months of refills. He will return in 6 months for an office visit to discuss medication. He will return or contact me sooner with any concerns.    Updated controlled substance agreement today.      follow up visit: 6 months, refills at 3 months    Fanny Damon PA-C  [facility]    "

## 2021-10-22 NOTE — LETTER
[facility]          [address], [address]  [place], [place] [address]  [phone]  Fax [phone]    10/22/21    Patient: Luis Alberto Claudio  YOB: 1985  Medical Record Number: [id number]                                                Controlled Substance Agreement  I understand that my care provider has prescribed controlled substances (narcotics, tranquilizers, and/or stimulants) to help manage my condition(s).  I am taking this medicine to help me function or work.  I know that this is strong medicine.  It could have serious side effects and even cause a dependency on the drug.  If I stop these medicines suddenly, I could have severe withdrawal symptoms.    The risks, benefits, and side effects of these medication(s) were explained to me.  I agree that:  1. I will take part in other treatments as advised by my provider.  This may be psychiatry or counseling, physical therapy, behavioral therapy, group treatment, or a referral to a pain clinic.  I will reduce or stop my medicine when my provider tells me to do so.   2. I will take my medicines as prescribed.  I will not change the dose or schedule unless my provider tells me to.  There will be no refills if I  run out early.   I may be contacted at any time without warning and asked to complete a drug test or pill count.   3. I will keep all my appointments at the clinic.  If I miss appointments or fail to follow instructions, my provider may stop my medicine.  4. I will not ask other providers to prescribe controlled substances. And I will not accept controlled substances from other people. If I need another prescribed controlled substance for a new reason, I will notify my provider within one business day.  5. If I enroll in the [place] Medical Marijuana program, I will tell my provider.  I will also sign an agreement to share my medical records with my provider.  6. I will use one pharmacy to fill all of my controlled  substance prescriptions.  If my prescription is mailed to my pharmacy, it may take 5 to 7 days for my medicine to be ready.  7. I understand that my provider, clinic care team, and pharmacy can track controlled substance prescriptions from other providers through a central database (prescription monitoring program).  8. I will bring in my list of medications (or my medicine bottles) each time I come to the clinic.  REV-  04/2016                                                                                                               Page  1 of 2    [facility]      10/22/21    Patient: Luis Alberto Claudio  YOB: 1985  Medical Record Number: [id number]    9. Refills of controlled substances will be made only during office hours.  It is up to me to make sure that I do not run out of my medicines on weekends or holidays.    10. I am responsible for my prescriptions.  If the medicine is lost or stolen, it will not be replaced.   I also agree not to share these medicines with anyone.  11. I agree to not use ANY illegal or recreational drugs.  This includes marijuana, cocaine, bath salts or other drugs.  I agree not to use alcohol unless my provider says I may.  I agree to give urine samples whenever asked.  If I fail to give a urine sample, the provider may stop my medicine.     12. I will tell my nurse or provider right away if I become pregnant or have a new medical problem treated outside of [facility].  13. I understand that this medicine can affect my thinking and judgment.  It may be unsafe for me to drive, use machinery and do dangerous tasks.  I will not do any of these things until I know how the medicine affects me.  If my dose changes, I will wait to see how it affects me.  I will contact my provider if I have concerns about medicine side effects.  I understand that if I do not follow any of the conditions above, my prescriptions or treatment may be stopped.    I agree that my  provider, clinic care team, and pharmacy may work with any city, state or federal law enforcement agency that investigates the misuse, sale, or other diversion of my controlled medicine. I will allow my provider to discuss my care with or share a copy of this agreement with any other treating provider, pharmacy or emergency room where I receive care.  I agree to give up (waive) any right of privacy or confidentiality with respect to these authorizations.   I have read this agreement and have asked questions about anything I did not understand.   ___________________________________    ___________________________  Patient Signature                                                           Date and Time  ___________________________________     ____________________________  Witness                                                                            Date and Time  ___________________________________  Fanny Damon PA-C  REV-  04/2016                                                                                                                                                                 Page 2 of 2

## 2021-10-22 NOTE — NURSING NOTE
Chief Complaint   Patient presents with     Recheck Medication     non fasting         Pre-visit Screening:  Immunizations:  up to date  Colonoscopy:  NA  Mammogram: NA  Asthma Action Test/Plan:  NA  PHQ9:  NA  GAD7:  NA  Questioned patient about current smoking habits Pt. has never smoked.  Ok to leave detailed message on voice mail for today's visit only Yes, phone # [phone]

## 2022-01-20 DIAGNOSIS — F90.2 ADHD (ATTENTION DEFICIT HYPERACTIVITY DISORDER), COMBINED TYPE: Primary | ICD-10-CM

## 2022-01-20 RX ORDER — DEXTROAMPHETAMINE SACCHARATE, AMPHETAMINE ASPARTATE, DEXTROAMPHETAMINE SULFATE AND AMPHETAMINE SULFATE 7.5; 7.5; 7.5; 7.5 MG/1; MG/1; MG/1; MG/1
30 TABLET ORAL 2 TIMES DAILY
Qty: 60 TABLET | Refills: 0 | Status: SHIPPED | OUTPATIENT
Start: 2022-02-20 | End: 2022-03-22

## 2022-01-20 RX ORDER — DEXTROAMPHETAMINE SACCHARATE, AMPHETAMINE ASPARTATE, DEXTROAMPHETAMINE SULFATE AND AMPHETAMINE SULFATE 7.5; 7.5; 7.5; 7.5 MG/1; MG/1; MG/1; MG/1
30 TABLET ORAL 2 TIMES DAILY
Qty: 60 TABLET | Refills: 0 | Status: SHIPPED | OUTPATIENT
Start: 2022-01-20 | End: 2022-02-19

## 2022-01-20 RX ORDER — DEXTROAMPHETAMINE SACCHARATE, AMPHETAMINE ASPARTATE, DEXTROAMPHETAMINE SULFATE AND AMPHETAMINE SULFATE 7.5; 7.5; 7.5; 7.5 MG/1; MG/1; MG/1; MG/1
30 TABLET ORAL 2 TIMES DAILY
Qty: 60 TABLET | Refills: 0 | Status: SHIPPED | OUTPATIENT
Start: 2022-03-23 | End: 2022-04-22

## 2022-01-20 NOTE — TELEPHONE ENCOUNTER
Pt last seen 10/22/21. He called asking for next 3 months of Adderall 30MG taking BID.     Luis Alberto Claudio is requesting a refill of:    Pending Prescriptions:                       Disp   Refills    amphetamine-dextroamphetamine (ADDERALL) *60 tab*0            Sig: Take 1 tablet (30 mg) by mouth 2 times daily    amphetamine-dextroamphetamine (ADDERALL) *60 tab*0            Sig: Take 1 tablet (30 mg) by mouth 2 times daily    amphetamine-dextroamphetamine (ADDERALL) *60 tab*0            Sig: Take 1 tablet (30 mg) by mouth 2 times daily

## 2022-04-28 ENCOUNTER — OFFICE VISIT (OUTPATIENT)
Dept: FAMILY MEDICINE | Facility: CLINIC | Age: 37
End: 2022-04-28

## 2022-04-28 VITALS
HEART RATE: 72 BPM | DIASTOLIC BLOOD PRESSURE: 82 MMHG | RESPIRATION RATE: 20 BRPM | HEIGHT: 74 IN | WEIGHT: 230.2 LBS | SYSTOLIC BLOOD PRESSURE: 136 MMHG | BODY MASS INDEX: 29.54 KG/M2 | TEMPERATURE: 97.5 F

## 2022-04-28 DIAGNOSIS — F90.2 ADHD (ATTENTION DEFICIT HYPERACTIVITY DISORDER), COMBINED TYPE: Primary | ICD-10-CM

## 2022-04-28 PROCEDURE — 99213 OFFICE O/P EST LOW 20 MIN: CPT | Performed by: FAMILY MEDICINE

## 2022-04-28 RX ORDER — DEXTROAMPHETAMINE SACCHARATE, AMPHETAMINE ASPARTATE, DEXTROAMPHETAMINE SULFATE AND AMPHETAMINE SULFATE 7.5; 7.5; 7.5; 7.5 MG/1; MG/1; MG/1; MG/1
30 TABLET ORAL 2 TIMES DAILY
Qty: 60 TABLET | Refills: 0 | Status: SHIPPED | OUTPATIENT
Start: 2022-05-29 | End: 2022-06-28

## 2022-04-28 RX ORDER — DEXTROAMPHETAMINE SACCHARATE, AMPHETAMINE ASPARTATE, DEXTROAMPHETAMINE SULFATE AND AMPHETAMINE SULFATE 7.5; 7.5; 7.5; 7.5 MG/1; MG/1; MG/1; MG/1
30 TABLET ORAL 2 TIMES DAILY
Qty: 60 TABLET | Refills: 0 | Status: SHIPPED | OUTPATIENT
Start: 2022-04-28 | End: 2022-05-28

## 2022-04-28 RX ORDER — DEXTROAMPHETAMINE SACCHARATE, AMPHETAMINE ASPARTATE, DEXTROAMPHETAMINE SULFATE AND AMPHETAMINE SULFATE 7.5; 7.5; 7.5; 7.5 MG/1; MG/1; MG/1; MG/1
30 TABLET ORAL 2 TIMES DAILY
Qty: 60 TABLET | Refills: 0 | Status: SHIPPED | OUTPATIENT
Start: 2022-06-29 | End: 2022-07-29

## 2022-04-28 NOTE — PROGRESS NOTES
"ADHD Follow Up   SUBJECTIVE:     Adderall 30 mg 0730 and 1300    Are your symptoms controlled? yes  Are you satisfied with your current medication dosage? yes  Are you taking your medication regularly? yes  Are you experiencing any insomnia? no  Are you experiencing any jitteriness? no  Are you experiencing any loss of appetite or weight loss? no  Are you experiencing any other side effects? no  ROS:   CONSTITUTIONAL:NEGATIVE   RESP: NEGATIVE   CV: NEGATIVE   NEURO: NEGATIVE  OBJECTIVE:  /82 (BP Location: Right arm, Patient Position: Chair, Cuff Size: Adult Large)   Pulse 72   Temp 97.5  F (36.4  C) (Temporal)   Resp 20   Ht 1.867 m (6' 1.5\")   Wt 104.4 kg (230 lb 3.2 oz)   BMI 29.96 kg/m    S1 and S2 normal, no murmurs, clicks, gallops or rubs. Regular rate and rhythm. Chest is clear; no wheezes or rales. No edema or JVD.     ASSESSMENT:  Per encounter diagnoses.    PLAN:  Per orders.    (F90.2) ADHD (attention deficit hyperactivity disorder), combined type  (primary encounter diagnosis)  Comment: well controlled, continue current medications at current doses PDMP reviewed  Plan: continue current medications at current doses    "

## 2022-04-28 NOTE — NURSING NOTE
Luis Alberto Claudio is here for a medication check and refill.    Questioned patient about current smoking habits.  Pt. has never smoked.  PULSE regular  My Chart:   CLASSIFICATION OF OVERWEIGHT AND OBESITY BY BMI                        Obesity Class           BMI(kg/m2)  Underweight                                    < 18.5  Normal                                         18.5-24.9  Overweight                                     25.0-29.9  OBESITY                     I                  30.0-34.9                             II                 35.0-39.9  EXTREME OBESITY             III                >40                            Patient's  BMI Body mass index is 29.96 kg/m .  http://hin.nhlbi.nih.gov/menuplanner/menu.cgi  Pre-visit planning  Immunizations - up to date  Colonoscopy -   Mammogram -   Asthma -   PHQ9 -    LAURIE-7 -

## 2022-08-08 DIAGNOSIS — F90.2 ADHD (ATTENTION DEFICIT HYPERACTIVITY DISORDER), COMBINED TYPE: Primary | ICD-10-CM

## 2022-08-08 RX ORDER — DEXTROAMPHETAMINE SACCHARATE, AMPHETAMINE ASPARTATE, DEXTROAMPHETAMINE SULFATE AND AMPHETAMINE SULFATE 7.5; 7.5; 7.5; 7.5 MG/1; MG/1; MG/1; MG/1
30 TABLET ORAL 2 TIMES DAILY
Qty: 60 TABLET | Refills: 0 | Status: CANCELLED | OUTPATIENT
Start: 2022-08-08 | End: 2022-09-07

## 2022-08-08 RX ORDER — DEXTROAMPHETAMINE SACCHARATE, AMPHETAMINE ASPARTATE, DEXTROAMPHETAMINE SULFATE AND AMPHETAMINE SULFATE 7.5; 7.5; 7.5; 7.5 MG/1; MG/1; MG/1; MG/1
30 TABLET ORAL 2 TIMES DAILY
Qty: 60 TABLET | Refills: 0 | Status: CANCELLED | OUTPATIENT
Start: 2022-09-08 | End: 2022-10-08

## 2022-08-08 NOTE — TELEPHONE ENCOUNTER
Luis Alberto called requesting a refill for Adderall.  Last seen on 4/28/2022.  Will route to Dr. Egan on behalf of Dr. Gamino who is out of clinic this week.    Thanks, Stephany      Pending Prescriptions:                       Disp   Refills    amphetamine-dextroamphetamine (ADDERALL) *60 tab*0            Sig: Take 1 tablet (30 mg) by mouth 2 times daily for           30 days    amphetamine-dextroamphetamine (ADDERALL) *60 tab*0            Sig: Take 1 tablet (30 mg) by mouth 2 times daily for           30 days    amphetamine-dextroamphetamine (ADDERALL) *60 tab*0            Sig: Take 1 tablet (30 mg) by mouth 2 times daily for           30 days

## 2022-08-09 RX ORDER — DEXTROAMPHETAMINE SACCHARATE, AMPHETAMINE ASPARTATE, DEXTROAMPHETAMINE SULFATE AND AMPHETAMINE SULFATE 7.5; 7.5; 7.5; 7.5 MG/1; MG/1; MG/1; MG/1
30 TABLET ORAL 2 TIMES DAILY
Qty: 60 TABLET | Refills: 0 | Status: SHIPPED | OUTPATIENT
Start: 2022-08-09 | End: 2022-09-08

## 2022-09-12 NOTE — TELEPHONE ENCOUNTER
Pt called asking for his next 2 months of Adderall.     Luis Alberto Claudio is requesting a refill of:    Pending Prescriptions:                       Disp   Refills    amphetamine-dextroamphetamine (ADDERALL) *60 tab*0            Sig: Take 1 tablet (30 mg) by mouth 2 times daily for           30 days    amphetamine-dextroamphetamine (ADDERALL) *60 tab*0            Sig: Take 1 tablet (30 mg) by mouth 2 times daily for           30 days    Signed Prescriptions:                        Disp   Refills    amphetamine-dextroamphetamine (ADDERALL) 3*60 tab*0        Sig: Take 1 tablet (30 mg) by mouth 2 times daily for 30           days  Authorizing Provider: ANDRE ANDRADE

## 2022-09-13 RX ORDER — DEXTROAMPHETAMINE SACCHARATE, AMPHETAMINE ASPARTATE, DEXTROAMPHETAMINE SULFATE AND AMPHETAMINE SULFATE 7.5; 7.5; 7.5; 7.5 MG/1; MG/1; MG/1; MG/1
30 TABLET ORAL 2 TIMES DAILY
Qty: 60 TABLET | Refills: 0 | Status: SHIPPED | OUTPATIENT
Start: 2022-10-13 | End: 2022-11-12

## 2022-09-13 RX ORDER — DEXTROAMPHETAMINE SACCHARATE, AMPHETAMINE ASPARTATE, DEXTROAMPHETAMINE SULFATE AND AMPHETAMINE SULFATE 7.5; 7.5; 7.5; 7.5 MG/1; MG/1; MG/1; MG/1
30 TABLET ORAL 2 TIMES DAILY
Qty: 60 TABLET | Refills: 0 | Status: SHIPPED | OUTPATIENT
Start: 2022-09-13 | End: 2022-09-29

## 2022-09-29 ENCOUNTER — TELEPHONE (OUTPATIENT)
Dept: FAMILY MEDICINE | Facility: CLINIC | Age: 37
End: 2022-09-29

## 2022-09-29 DIAGNOSIS — F90.2 ADHD (ATTENTION DEFICIT HYPERACTIVITY DISORDER), COMBINED TYPE: ICD-10-CM

## 2022-09-29 RX ORDER — DEXTROAMPHETAMINE SACCHARATE, AMPHETAMINE ASPARTATE, DEXTROAMPHETAMINE SULFATE AND AMPHETAMINE SULFATE 7.5; 7.5; 7.5; 7.5 MG/1; MG/1; MG/1; MG/1
30 TABLET ORAL 2 TIMES DAILY
Qty: 50 TABLET | Refills: 0 | Status: SHIPPED | OUTPATIENT
Start: 2022-09-29 | End: 2023-05-18

## 2022-09-29 NOTE — TELEPHONE ENCOUNTER
Luis Alberto B Sutton called the clinic support line with the following:    Only received a partial refill of his Adderall. Found another [facility] that could fill the rest    Luis Alberto Claudio is requesting a refill of:    Pending Prescriptions:                       Disp   Refills    amphetamine-dextroamphetamine (ADDERALL) *50 tab*0            Sig: Take 1 tablet (30 mg) by mouth 2 times daily

## 2022-12-12 ENCOUNTER — OFFICE VISIT (OUTPATIENT)
Dept: FAMILY MEDICINE | Facility: CLINIC | Age: 37
End: 2022-12-12

## 2022-12-12 VITALS
WEIGHT: 233.2 LBS | RESPIRATION RATE: 20 BRPM | HEIGHT: 74 IN | DIASTOLIC BLOOD PRESSURE: 84 MMHG | BODY MASS INDEX: 29.93 KG/M2 | SYSTOLIC BLOOD PRESSURE: 132 MMHG | TEMPERATURE: 97.7 F | HEART RATE: 76 BPM

## 2022-12-12 DIAGNOSIS — Z23 FLU VACCINE NEED: ICD-10-CM

## 2022-12-12 DIAGNOSIS — Z79.899 CONTROLLED SUBSTANCE AGREEMENT SIGNED: ICD-10-CM

## 2022-12-12 DIAGNOSIS — F90.2 ADHD (ATTENTION DEFICIT HYPERACTIVITY DISORDER), COMBINED TYPE: Primary | ICD-10-CM

## 2022-12-12 PROCEDURE — 90686 IIV4 VACC NO PRSV 0.5 ML IM: CPT | Performed by: FAMILY MEDICINE

## 2022-12-12 PROCEDURE — 99213 OFFICE O/P EST LOW 20 MIN: CPT | Mod: 25 | Performed by: FAMILY MEDICINE

## 2022-12-12 PROCEDURE — 90471 IMMUNIZATION ADMIN: CPT | Performed by: FAMILY MEDICINE

## 2022-12-12 RX ORDER — DEXTROAMPHETAMINE SACCHARATE, AMPHETAMINE ASPARTATE, DEXTROAMPHETAMINE SULFATE AND AMPHETAMINE SULFATE 7.5; 7.5; 7.5; 7.5 MG/1; MG/1; MG/1; MG/1
30 TABLET ORAL 2 TIMES DAILY
Qty: 60 TABLET | Refills: 0 | Status: SHIPPED | OUTPATIENT
Start: 2023-01-12 | End: 2023-02-11

## 2022-12-12 RX ORDER — DEXTROAMPHETAMINE SACCHARATE, AMPHETAMINE ASPARTATE, DEXTROAMPHETAMINE SULFATE AND AMPHETAMINE SULFATE 7.5; 7.5; 7.5; 7.5 MG/1; MG/1; MG/1; MG/1
30 TABLET ORAL 2 TIMES DAILY
Qty: 60 TABLET | Refills: 0 | Status: SHIPPED | OUTPATIENT
Start: 2022-12-12 | End: 2023-01-11

## 2022-12-12 RX ORDER — DEXTROAMPHETAMINE SACCHARATE, AMPHETAMINE ASPARTATE, DEXTROAMPHETAMINE SULFATE AND AMPHETAMINE SULFATE 7.5; 7.5; 7.5; 7.5 MG/1; MG/1; MG/1; MG/1
30 TABLET ORAL 2 TIMES DAILY
Qty: 60 TABLET | Refills: 0 | Status: SHIPPED | OUTPATIENT
Start: 2023-02-12 | End: 2023-03-14

## 2022-12-12 ASSESSMENT — ANXIETY QUESTIONNAIRES
6. BECOMING EASILY ANNOYED OR IRRITABLE: NOT AT ALL
2. NOT BEING ABLE TO STOP OR CONTROL WORRYING: SEVERAL DAYS
7. FEELING AFRAID AS IF SOMETHING AWFUL MIGHT HAPPEN: NOT AT ALL
5. BEING SO RESTLESS THAT IT IS HARD TO SIT STILL: MORE THAN HALF THE DAYS
3. WORRYING TOO MUCH ABOUT DIFFERENT THINGS: NEARLY EVERY DAY
1. FEELING NERVOUS, ANXIOUS, OR ON EDGE: MORE THAN HALF THE DAYS
IF YOU CHECKED OFF ANY PROBLEMS ON THIS QUESTIONNAIRE, HOW DIFFICULT HAVE THESE PROBLEMS MADE IT FOR YOU TO DO YOUR WORK, TAKE CARE OF THINGS AT HOME, OR GET ALONG WITH OTHER PEOPLE: VERY DIFFICULT
GAD7 TOTAL SCORE: 10
GAD7 TOTAL SCORE: 10

## 2022-12-12 ASSESSMENT — PATIENT HEALTH QUESTIONNAIRE - PHQ9
SUM OF ALL RESPONSES TO PHQ QUESTIONS 1-9: 18
5. POOR APPETITE OR OVEREATING: MORE THAN HALF THE DAYS

## 2022-12-12 NOTE — NURSING NOTE
Luis Alberto Claudio is here for a medication check and refill.  Questioned patient about current smoking habits.  Pt. has never smoked.  PULSE regular  My Chart: declines  CLASSIFICATION OF OVERWEIGHT AND OBESITY BY BMI                        Obesity Class           BMI(kg/m2)  Underweight                                    < 18.5  Normal                                         18.5-24.9  Overweight                                     25.0-29.9  OBESITY                     I                  30.0-34.9                             II                 35.0-39.9  EXTREME OBESITY             III                >40                            Patient's  BMI Body mass index is 30.35 kg/m .  http://hin.nhlbi.nih.gov/menuplanner/menu.cgi  Pre-visit planning  Immunizations - up to date  Colonoscopy -   Mammogram -   Asthma -   PHQ9 -    LAURIE-7 -      The patient has verbalized that it is ok to leave a detailed voice message on the patient's cell phone with results/recommendations from this visit.

## 2022-12-12 NOTE — LETTER
[facility]          [address], [address]  [place], [place] [address]  [phone]  Fax [phone]    12/12/22    Patient: Luis Alberto Claudio  YOB: 1985  Medical Record Number: [id number]                                                Controlled Substance Agreement  I understand that my care provider has prescribed controlled substances (narcotics, tranquilizers, and/or stimulants) to help manage my condition(s).  I am taking this medicine to help me function or work.  I know that this is strong medicine.  It could have serious side effects and even cause a dependency on the drug.  If I stop these medicines suddenly, I could have severe withdrawal symptoms.    The risks, benefits, and side effects of these medication(s) were explained to me.  I agree that:  1. I will take part in other treatments as advised by my provider.  This may be psychiatry or counseling, physical therapy, behavioral therapy, group treatment, or a referral to a pain clinic.  I will reduce or stop my medicine when my provider tells me to do so.   2. I will take my medicines as prescribed.  I will not change the dose or schedule unless my provider tells me to.  There will be no refills if I  run out early.   I may be contacted at any time without warning and asked to complete a drug test or pill count.   3. I will keep all my appointments at the clinic.  If I miss appointments or fail to follow instructions, my provider may stop my medicine.  4. I will not ask other providers to prescribe controlled substances. And I will not accept controlled substances from other people. If I need another prescribed controlled substance for a new reason, I will notify my provider within one business day.  5. If I enroll in the [place] Medical Marijuana program, I will tell my provider.  I will also sign an agreement to share my medical records with my provider.  6. I will use one pharmacy to fill all of my controlled  substance prescriptions.  If my prescription is mailed to my pharmacy, it may take 5 to 7 days for my medicine to be ready.  7. I understand that my provider, clinic care team, and pharmacy can track controlled substance prescriptions from other providers through a central database (prescription monitoring program).  8. I will bring in my list of medications (or my medicine bottles) each time I come to the clinic.  REV-  04/2016                                                                                                               Page  1 of 2    [facility]      12/12/22    Patient: Luis Alberto Claudio  YOB: 1985  Medical Record Number: [id number]    9. Refills of controlled substances will be made only during office hours.  It is up to me to make sure that I do not run out of my medicines on weekends or holidays.    10. I am responsible for my prescriptions.  If the medicine is lost or stolen, it will not be replaced.   I also agree not to share these medicines with anyone.  11. I agree to not use ANY illegal or recreational drugs.  This includes marijuana, cocaine, bath salts or other drugs.  I agree not to use alcohol unless my provider says I may.  I agree to give urine samples whenever asked.  If I fail to give a urine sample, the provider may stop my medicine.     12. I will tell my nurse or provider right away if I become pregnant or have a new medical problem treated outside of [facility].  13. I understand that this medicine can affect my thinking and judgment.  It may be unsafe for me to drive, use machinery and do dangerous tasks.  I will not do any of these things until I know how the medicine affects me.  If my dose changes, I will wait to see how it affects me.  I will contact my provider if I have concerns about medicine side effects.  I understand that if I do not follow any of the conditions above, my prescriptions or treatment may be stopped.    I agree that my  provider, clinic care team, and pharmacy may work with any city, state or federal law enforcement agency that investigates the misuse, sale, or other diversion of my controlled medicine. I will allow my provider to discuss my care with or share a copy of this agreement with any other treating provider, pharmacy or emergency room where I receive care.  I agree to give up (waive) any right of privacy or confidentiality with respect to these authorizations.   I have read this agreement and have asked questions about anything I did not understand.   ___________________________________    ___________________________  Patient Signature                                                           Date and Time  ___________________________________     ____________________________  Witness                                                                            Date and Time  ___________________________________  Dat Gamino MD  REV-  04/2016                                                                                                                                                                 Page 2 of 2

## 2022-12-12 NOTE — PROGRESS NOTES
"ADHD Follow Up   SUBJECTIVE:     Are your symptoms controlled? yes  Are you satisfied with your current medication dosage? yes  Are you taking your medication regularly? yes  Are you experiencing any insomnia? No, sleeping more  Are you experiencing any jitteriness? no  Are you experiencing any loss of appetite or weight loss? no  Are you experiencing any other side effects? Some decreased motivation-was laid off and then started new job    Pt states all of his family is treated for depression-wants to explore this,. No suicidal thoughts  ROS:   CONSTITUTIONAL:NEGATIVE   RESP: NEGATIVE   CV: NEGATIVE   NEURO: NEGATIVE  OBJECTIVE:  /84 (BP Location: Left arm, Patient Position: Chair, Cuff Size: Adult Large)   Pulse 76   Temp 97.7  F (36.5  C) (Temporal)   Resp 20   Ht 1.867 m (6' 1.5\")   Wt 105.8 kg (233 lb 3.2 oz)   BMI 30.35 kg/m    S1 and S2 normal, no murmurs, clicks, gallops or rubs. Regular rate and rhythm. Chest is clear; no wheezes or rales. No edema or JVD.     Psych: Alert and oriented times 3; coherent speech, normal   rate and volume, able to articulate logical thoughts, able   to abstract reason, no tangential thoughts, no hallucinations   or delusions  His affect is slightly flat     ASSESSMENT:  Per encounter diagnoses.    PLAN:  Per orders.    (F90.2) ADHD (attention deficit hyperactivity disorder), combined type  (primary encounter diagnosis)  Comment: well controlled, continue current medications at current doses PDMP reviewed  Plan: continue current medications at current doses     Pt given numbers for psychology for eval, can return here if medication recommended    (Z79.899) Controlled substance agreement signed  Comment:   Plan:    "

## 2023-03-13 DIAGNOSIS — F90.2 ADHD (ATTENTION DEFICIT HYPERACTIVITY DISORDER), COMBINED TYPE: Primary | ICD-10-CM

## 2023-03-13 RX ORDER — DEXTROAMPHETAMINE SACCHARATE, AMPHETAMINE ASPARTATE, DEXTROAMPHETAMINE SULFATE AND AMPHETAMINE SULFATE 7.5; 7.5; 7.5; 7.5 MG/1; MG/1; MG/1; MG/1
30 TABLET ORAL 2 TIMES DAILY
Qty: 60 TABLET | Refills: 0 | Status: SHIPPED | OUTPATIENT
Start: 2023-03-13 | End: 2023-04-12

## 2023-03-13 RX ORDER — DEXTROAMPHETAMINE SACCHARATE, AMPHETAMINE ASPARTATE, DEXTROAMPHETAMINE SULFATE AND AMPHETAMINE SULFATE 7.5; 7.5; 7.5; 7.5 MG/1; MG/1; MG/1; MG/1
30 TABLET ORAL 2 TIMES DAILY
Qty: 60 TABLET | Refills: 0 | Status: SHIPPED | OUTPATIENT
Start: 2023-04-13 | End: 2023-05-13

## 2023-03-13 RX ORDER — DEXTROAMPHETAMINE SACCHARATE, AMPHETAMINE ASPARTATE, DEXTROAMPHETAMINE SULFATE AND AMPHETAMINE SULFATE 7.5; 7.5; 7.5; 7.5 MG/1; MG/1; MG/1; MG/1
30 TABLET ORAL 2 TIMES DAILY
Qty: 60 TABLET | Refills: 0 | Status: SHIPPED | OUTPATIENT
Start: 2023-05-14 | End: 2023-06-13

## 2023-03-13 NOTE — TELEPHONE ENCOUNTER
Luis Alberto Claudio is requesting a refill of:    Pending Prescriptions:                       Disp   Refills    amphetamine-dextroamphetamine (ADDERALL) *60 tab*0            Sig: Take 1 tablet (30 mg) by mouth 2 times daily for           30 days    amphetamine-dextroamphetamine (ADDERALL) *60 tab*0            Sig: Take 1 tablet (30 mg) by mouth 2 times daily for           30 days    amphetamine-dextroamphetamine (ADDERALL) *60 tab*0            Sig: Take 1 tablet (30 mg) by mouth 2 times daily for           30 days

## 2023-05-18 ENCOUNTER — OFFICE VISIT (OUTPATIENT)
Dept: FAMILY MEDICINE | Facility: CLINIC | Age: 38
End: 2023-05-18

## 2023-05-18 VITALS
TEMPERATURE: 97.1 F | WEIGHT: 235.6 LBS | RESPIRATION RATE: 20 BRPM | SYSTOLIC BLOOD PRESSURE: 136 MMHG | HEIGHT: 74 IN | BODY MASS INDEX: 30.24 KG/M2 | HEART RATE: 68 BPM | DIASTOLIC BLOOD PRESSURE: 86 MMHG

## 2023-05-18 DIAGNOSIS — F90.2 ADHD (ATTENTION DEFICIT HYPERACTIVITY DISORDER), COMBINED TYPE: Primary | ICD-10-CM

## 2023-05-18 DIAGNOSIS — Z23 NEED FOR TETANUS BOOSTER: ICD-10-CM

## 2023-05-18 DIAGNOSIS — Z79.899 CONTROLLED SUBSTANCE AGREEMENT SIGNED: ICD-10-CM

## 2023-05-18 PROCEDURE — 90715 TDAP VACCINE 7 YRS/> IM: CPT | Performed by: FAMILY MEDICINE

## 2023-05-18 PROCEDURE — 99213 OFFICE O/P EST LOW 20 MIN: CPT | Mod: 25 | Performed by: FAMILY MEDICINE

## 2023-05-18 PROCEDURE — 90471 IMMUNIZATION ADMIN: CPT | Performed by: FAMILY MEDICINE

## 2023-05-18 RX ORDER — DEXTROAMPHETAMINE SACCHARATE, AMPHETAMINE ASPARTATE, DEXTROAMPHETAMINE SULFATE AND AMPHETAMINE SULFATE 7.5; 7.5; 7.5; 7.5 MG/1; MG/1; MG/1; MG/1
30 TABLET ORAL 2 TIMES DAILY
Qty: 60 TABLET | Refills: 0 | Status: SHIPPED | OUTPATIENT
Start: 2023-05-18 | End: 2023-06-17

## 2023-05-18 RX ORDER — DEXTROAMPHETAMINE SACCHARATE, AMPHETAMINE ASPARTATE, DEXTROAMPHETAMINE SULFATE AND AMPHETAMINE SULFATE 7.5; 7.5; 7.5; 7.5 MG/1; MG/1; MG/1; MG/1
30 TABLET ORAL 2 TIMES DAILY
Qty: 60 TABLET | Refills: 0 | Status: SHIPPED | OUTPATIENT
Start: 2023-07-19 | End: 2023-08-18

## 2023-05-18 RX ORDER — DEXTROAMPHETAMINE SACCHARATE, AMPHETAMINE ASPARTATE, DEXTROAMPHETAMINE SULFATE AND AMPHETAMINE SULFATE 7.5; 7.5; 7.5; 7.5 MG/1; MG/1; MG/1; MG/1
30 TABLET ORAL 2 TIMES DAILY
Qty: 60 TABLET | Refills: 0 | Status: SHIPPED | OUTPATIENT
Start: 2023-06-18 | End: 2023-07-18

## 2023-05-18 NOTE — LETTER
[facility]          [address], [address]  [place], [place] [address]  [phone]  Fax [phone]    05/18/23    Patient: Luis Alberto Claudio  YOB: 1985  Medical Record Number: [id number]                                                Controlled Substance Agreement  I understand that my care provider has prescribed controlled substances (narcotics, tranquilizers, and/or stimulants) to help manage my condition(s).  I am taking this medicine to help me function or work.  I know that this is strong medicine.  It could have serious side effects and even cause a dependency on the drug.  If I stop these medicines suddenly, I could have severe withdrawal symptoms.    The risks, benefits, and side effects of these medication(s) were explained to me.  I agree that:  I will take part in other treatments as advised by my provider.  This may be psychiatry or counseling, physical therapy, behavioral therapy, group treatment, or a referral to a pain clinic.  I will reduce or stop my medicine when my provider tells me to do so.   I will take my medicines as prescribed.  I will not change the dose or schedule unless my provider tells me to.  There will be no refills if I  run out early.   I may be contacted at any time without warning and asked to complete a drug test or pill count.   I will keep all my appointments at the clinic.  If I miss appointments or fail to follow instructions, my provider may stop my medicine.  I will not ask other providers to prescribe controlled substances. And I will not accept controlled substances from other people. If I need another prescribed controlled substance for a new reason, I will notify my provider within one business day.  If I enroll in the [place] Medical Marijuana program, I will tell my provider.  I will also sign an agreement to share my medical records with my provider.  I will use one pharmacy to fill all of my controlled substance prescriptions.   If my prescription is mailed to my pharmacy, it may take 5 to 7 days for my medicine to be ready.  I understand that my provider, clinic care team, and pharmacy can track controlled substance prescriptions from other providers through a central database (prescription monitoring program).  I will bring in my list of medications (or my medicine bottles) each time I come to the clinic.  REV- 04/2016                                                                                                               Page  1 of 2    [facility]      05/18/23    Patient: Luis Alberto Claudio  YOB: 1985  Medical Record Number: [id number]    Refills of controlled substances will be made only during office hours.  It is up to me to make sure that I do not run out of my medicines on weekends or holidays.    I am responsible for my prescriptions.  If the medicine is lost or stolen, it will not be replaced.   I also agree not to share these medicines with anyone.  I agree to not use ANY illegal or recreational drugs.  This includes marijuana, cocaine, bath salts or other drugs.  I agree not to use alcohol unless my provider says I may.  I agree to give urine samples whenever asked.  If I fail to give a urine sample, the provider may stop my medicine.     I will tell my nurse or provider right away if I become pregnant or have a new medical problem treated outside of [facility].  I understand that this medicine can affect my thinking and judgment.  It may be unsafe for me to drive, use machinery and do dangerous tasks.  I will not do any of these things until I know how the medicine affects me.  If my dose changes, I will wait to see how it affects me.  I will contact my provider if I have concerns about medicine side effects.  I understand that if I do not follow any of the conditions above, my prescriptions or treatment may be stopped.    I agree that my provider, clinic care team, and pharmacy may work  with any city, state or federal law enforcement agency that investigates the misuse, sale, or other diversion of my controlled medicine. I will allow my provider to discuss my care with or share a copy of this agreement with any other treating provider, pharmacy or emergency room where I receive care.  I agree to give up (waive) any right of privacy or confidentiality with respect to these authorizations.   I have read this agreement and have asked questions about anything I did not understand.   ___________________________________    ___________________________  Patient Signature                                                           Date and Time  ___________________________________     ____________________________  Witness                                                                            Date and Time  ___________________________________  Dat Gamino MD  REV-  04/2016                                                                                                                                                                 Page 2 of 2

## 2023-05-18 NOTE — PROGRESS NOTES
"ADHD Follow Up   SUBJECTIVE:     Are your symptoms controlled? Yes   Are you satisfied with your current medication dosage? yes  Are you taking your medication regularly? yes  Are you experiencing any insomnia? no  Are you experiencing any jitteriness? no  Are you experiencing any loss of appetite or weight loss? no  Are you experiencing any other side effects? no  ROS:   CONSTITUTIONAL:NEGATIVE   RESP: NEGATIVE   CV: NEGATIVE   NEURO: NEGATIVE  OBJECTIVE:  /86 (BP Location: Left arm, Patient Position: Chair, Cuff Size: Adult Regular)   Pulse 68   Temp 97.1  F (36.2  C) (Temporal)   Resp 20   Ht 1.867 m (6' 1.5\")   Wt 106.9 kg (235 lb 9.6 oz)   BMI 30.66 kg/m    S1 and S2 normal, no murmurs, clicks, gallops or rubs. Regular rate and rhythm. Chest is clear; no wheezes or rales. No edema or JVD.     PDMP reviewed    ASSESSMENT:  Per encounter diagnoses.    PLAN:  Per orders.    (F90.2) ADHD (attention deficit hyperactivity disorder), combined type  (primary encounter diagnosis)  Comment: well controlled  Plan:     (Z79.899) Controlled substance agreement signed-12/22/22  Comment:   Plan:     (Z23) Need for tetanus booster  Comment:   Plan:    "

## 2023-05-18 NOTE — NURSING NOTE
Luis Alberto Claudio is here for a medication check and refill.  Questioned patient about current smoking habits.  Pt. has never smoked.  PULSE regular  My Chart:   CLASSIFICATION OF OVERWEIGHT AND OBESITY BY BMI                        Obesity Class           BMI(kg/m2)  Underweight                                    < 18.5  Normal                                         18.5-24.9  Overweight                                     25.0-29.9  OBESITY                     I                  30.0-34.9                             II                 35.0-39.9  EXTREME OBESITY             III                >40                            Patient's  BMI Body mass index is 30.66 kg/m .  http://hin.nhlbi.nih.gov/menuplanner/menu.cgi  Pre-visit planning  Immunizations - Tdap  Colonoscopy -   Mammogram -   Asthma -   PHQ9 -    LAURIE-7 -      The patient has verbalized that it is ok to leave a detailed voice message on the patient's cell phone with results/recommendations from this visit.

## 2023-09-18 DIAGNOSIS — F90.2 ADHD (ATTENTION DEFICIT HYPERACTIVITY DISORDER), COMBINED TYPE: Primary | ICD-10-CM

## 2023-09-18 RX ORDER — DEXTROAMPHETAMINE SACCHARATE, AMPHETAMINE ASPARTATE, DEXTROAMPHETAMINE SULFATE AND AMPHETAMINE SULFATE 7.5; 7.5; 7.5; 7.5 MG/1; MG/1; MG/1; MG/1
30 TABLET ORAL 2 TIMES DAILY
Qty: 60 TABLET | Refills: 0 | Status: SHIPPED | OUTPATIENT
Start: 2023-10-19 | End: 2023-11-18

## 2023-09-18 RX ORDER — DEXTROAMPHETAMINE SACCHARATE, AMPHETAMINE ASPARTATE, DEXTROAMPHETAMINE SULFATE AND AMPHETAMINE SULFATE 7.5; 7.5; 7.5; 7.5 MG/1; MG/1; MG/1; MG/1
30 TABLET ORAL 2 TIMES DAILY
Qty: 60 TABLET | Refills: 0 | Status: SHIPPED | OUTPATIENT
Start: 2023-09-18 | End: 2023-10-18

## 2023-09-18 RX ORDER — DEXTROAMPHETAMINE SACCHARATE, AMPHETAMINE ASPARTATE, DEXTROAMPHETAMINE SULFATE AND AMPHETAMINE SULFATE 7.5; 7.5; 7.5; 7.5 MG/1; MG/1; MG/1; MG/1
30 TABLET ORAL 2 TIMES DAILY
Qty: 60 TABLET | Refills: 0 | Status: SHIPPED | OUTPATIENT
Start: 2023-11-19 | End: 2023-11-22

## 2023-09-18 NOTE — TELEPHONE ENCOUNTER
Patient called into the CS line asking for the next 3 month supply of his   Pending Prescriptions:                       Disp   Refills    amphetamine-dextroamphetamine (ADDERALL) *60 tab*0            Sig: Take 1 tablet (30 mg) by mouth 2 times daily for           30 days    amphetamine-dextroamphetamine (ADDERALL) *60 tab*0            Sig: Take 1 tablet (30 mg) by mouth 2 times daily for           30 days    amphetamine-dextroamphetamine (ADDERALL) *60 tab*0            Sig: Take 1 tablet (30 mg) by mouth 2 times daily for           30 days    He was last seen in May for his medication check visit so he is up to date-routing to Dr. Gamino for approval.

## 2023-11-22 DIAGNOSIS — F90.2 ADHD (ATTENTION DEFICIT HYPERACTIVITY DISORDER), COMBINED TYPE: ICD-10-CM

## 2023-11-22 RX ORDER — DEXTROAMPHETAMINE SACCHARATE, AMPHETAMINE ASPARTATE, DEXTROAMPHETAMINE SULFATE AND AMPHETAMINE SULFATE 7.5; 7.5; 7.5; 7.5 MG/1; MG/1; MG/1; MG/1
30 TABLET ORAL 2 TIMES DAILY
Qty: 60 TABLET | Refills: 0 | Status: SHIPPED | OUTPATIENT
Start: 2023-11-22

## 2023-11-22 NOTE — TELEPHONE ENCOUNTER
Luis Alberto Claudio is requesting a refill of:    Pending Prescriptions:                       Disp   Refills    amphetamine-dextroamphetamine (ADDERALL) *60 tab*0            Sig: Take 1 tablet (30 mg) by mouth 2 times daily    Pt needs this sent to another [facility], they have in stock. Has been out for a couple weeks.    Can you review for [facility]

## 2024-01-02 ENCOUNTER — OFFICE VISIT (OUTPATIENT)
Dept: FAMILY MEDICINE | Facility: CLINIC | Age: 39
End: 2024-01-02

## 2024-01-02 VITALS
HEART RATE: 88 BPM | HEIGHT: 74 IN | BODY MASS INDEX: 30.29 KG/M2 | SYSTOLIC BLOOD PRESSURE: 138 MMHG | RESPIRATION RATE: 20 BRPM | WEIGHT: 236 LBS | DIASTOLIC BLOOD PRESSURE: 76 MMHG | TEMPERATURE: 97.3 F

## 2024-01-02 DIAGNOSIS — Z79.899 CONTROLLED SUBSTANCE AGREEMENT SIGNED: ICD-10-CM

## 2024-01-02 DIAGNOSIS — F90.2 ADHD (ATTENTION DEFICIT HYPERACTIVITY DISORDER), COMBINED TYPE: Primary | ICD-10-CM

## 2024-01-02 PROCEDURE — 99213 OFFICE O/P EST LOW 20 MIN: CPT | Performed by: FAMILY MEDICINE

## 2024-01-02 RX ORDER — DEXTROAMPHETAMINE SACCHARATE, AMPHETAMINE ASPARTATE, DEXTROAMPHETAMINE SULFATE AND AMPHETAMINE SULFATE 7.5; 7.5; 7.5; 7.5 MG/1; MG/1; MG/1; MG/1
30 TABLET ORAL 2 TIMES DAILY
Qty: 60 TABLET | Refills: 0 | Status: SHIPPED | OUTPATIENT
Start: 2024-02-02 | End: 2024-03-03

## 2024-01-02 RX ORDER — DEXTROAMPHETAMINE SACCHARATE, AMPHETAMINE ASPARTATE, DEXTROAMPHETAMINE SULFATE AND AMPHETAMINE SULFATE 7.5; 7.5; 7.5; 7.5 MG/1; MG/1; MG/1; MG/1
30 TABLET ORAL 2 TIMES DAILY
Qty: 60 TABLET | Refills: 0 | Status: SHIPPED | OUTPATIENT
Start: 2024-01-02 | End: 2024-02-01

## 2024-01-02 RX ORDER — DEXTROAMPHETAMINE SACCHARATE, AMPHETAMINE ASPARTATE, DEXTROAMPHETAMINE SULFATE AND AMPHETAMINE SULFATE 7.5; 7.5; 7.5; 7.5 MG/1; MG/1; MG/1; MG/1
30 TABLET ORAL 2 TIMES DAILY
Qty: 60 TABLET | Refills: 0 | Status: SHIPPED | OUTPATIENT
Start: 2024-03-04 | End: 2024-04-03

## 2024-01-02 NOTE — NURSING NOTE
Luis Alberto Claudio is here for a medication check and refill.    Questioned patient about current smoking habits.  Pt. has never smoked.  PULSE regular  My Chart: declines  CLASSIFICATION OF OVERWEIGHT AND OBESITY BY BMI                        Obesity Class           BMI(kg/m2)  Underweight                                    < 18.5  Normal                                         18.5-24.9  Overweight                                     25.0-29.9  OBESITY                     I                  30.0-34.9                             II                 35.0-39.9  EXTREME OBESITY             III                >40                            Patient's  BMI Body mass index is 30.71 kg/m .  http://hin.nhlbi.nih.gov/menuplanner/menu.cgi  Pre-visit planning  Immunizations - up to date  Colonoscopy -   Mammogram -   Asthma -   PHQ9 -    LAURIE-7 -      The patient has verbalized that it is ok to leave a detailed voice message on the patient's cell phone with results/recommendations from this visit.

## 2024-01-02 NOTE — PROGRESS NOTES
"ADHD Follow Up   SUBJECTIVE:     Adderall 30 mg IR BID    Pt mother in law dx rectal CA- living with them for tratment help, lots of added stress    Are your symptoms controlled? yes  Are you satisfied with your current medication dosage? yes  Are you taking your medication regularly? yes  Are you experiencing any insomnia? No  Are you experiencing any jitteriness? no  Are you experiencing any loss of appetite or weight loss? no  Are you experiencing any other side effects? no  ROS:   CONSTITUTIONAL:NEGATIVE   RESP: NEGATIVE   CV: NEGATIVE   NEURO: NEGATIVE  OBJECTIVE:  /76 (BP Location: Right arm, Patient Position: Chair, Cuff Size: Adult Large)   Pulse 88   Temp 97.3  F (36.3  C) (Temporal)   Resp 20   Ht 1.867 m (6' 1.5\")   Wt 107 kg (236 lb)   BMI 30.71 kg/m    S1 and S2 normal, no murmurs, clicks, gallops or rubs. Regular rate and rhythm. Chest is clear; no wheezes or rales. No edema or JVD.     ASSESSMENT:  Per encounter diagnoses.    PLAN:  Per orders.    (F90.2) ADHD (attention deficit hyperactivity disorder), combined type  (primary encounter diagnosis)  Comment: well controlled, continue current medications at current doses , PDMP reviewed  Plan: continue current medications at current doses     (Z79.899) Controlled substance agreement signed-5/18/23  Comment:   Plan:    "

## 2024-03-29 DIAGNOSIS — F90.2 ADHD (ATTENTION DEFICIT HYPERACTIVITY DISORDER), COMBINED TYPE: Primary | ICD-10-CM

## 2024-03-29 NOTE — TELEPHONE ENCOUNTER
Pt called asking for his next 3 months of adderall, pt last seen 01/02/24.    Luis Alberto Claudio is requesting a refill of:    Pending Prescriptions:                       Disp   Refills    amphetamine-dextroamphetamine (ADDERALL) *60 tab*0            Sig: Take 1 tablet (30 mg) by mouth 2 times daily for           30 days    amphetamine-dextroamphetamine (ADDERALL) *60 tab*0            Sig: Take 1 tablet (30 mg) by mouth 2 times daily for           30 days    amphetamine-dextroamphetamine (ADDERALL) *60 tab*0            Sig: Take 1 tablet (30 mg) by mouth 2 times daily for           30 days

## 2024-04-01 RX ORDER — DEXTROAMPHETAMINE SACCHARATE, AMPHETAMINE ASPARTATE, DEXTROAMPHETAMINE SULFATE AND AMPHETAMINE SULFATE 7.5; 7.5; 7.5; 7.5 MG/1; MG/1; MG/1; MG/1
30 TABLET ORAL 2 TIMES DAILY
Qty: 60 TABLET | Refills: 0 | Status: SHIPPED | OUTPATIENT
Start: 2024-04-03 | End: 2024-05-03

## 2024-04-01 RX ORDER — DEXTROAMPHETAMINE SACCHARATE, AMPHETAMINE ASPARTATE, DEXTROAMPHETAMINE SULFATE AND AMPHETAMINE SULFATE 7.5; 7.5; 7.5; 7.5 MG/1; MG/1; MG/1; MG/1
30 TABLET ORAL 2 TIMES DAILY
Qty: 60 TABLET | Refills: 0 | Status: SHIPPED | OUTPATIENT
Start: 2024-06-04 | End: 2024-07-04

## 2024-04-01 RX ORDER — DEXTROAMPHETAMINE SACCHARATE, AMPHETAMINE ASPARTATE, DEXTROAMPHETAMINE SULFATE AND AMPHETAMINE SULFATE 7.5; 7.5; 7.5; 7.5 MG/1; MG/1; MG/1; MG/1
30 TABLET ORAL 2 TIMES DAILY
Qty: 60 TABLET | Refills: 0 | Status: SHIPPED | OUTPATIENT
Start: 2024-05-04 | End: 2024-06-03

## 2025-01-25 ENCOUNTER — HEALTH MAINTENANCE LETTER (OUTPATIENT)
Age: 40
End: 2025-01-25